# Patient Record
Sex: FEMALE | Race: WHITE | Employment: OTHER | URBAN - METROPOLITAN AREA
[De-identification: names, ages, dates, MRNs, and addresses within clinical notes are randomized per-mention and may not be internally consistent; named-entity substitution may affect disease eponyms.]

---

## 2021-07-06 ENCOUNTER — OFFICE VISIT (OUTPATIENT)
Dept: FAMILY MEDICINE CLINIC | Facility: CLINIC | Age: 61
End: 2021-07-06
Payer: COMMERCIAL

## 2021-07-06 ENCOUNTER — TELEPHONE (OUTPATIENT)
Dept: FAMILY MEDICINE CLINIC | Facility: CLINIC | Age: 61
End: 2021-07-06

## 2021-07-06 VITALS
DIASTOLIC BLOOD PRESSURE: 80 MMHG | RESPIRATION RATE: 16 BRPM | SYSTOLIC BLOOD PRESSURE: 120 MMHG | TEMPERATURE: 99 F | BODY MASS INDEX: 19.6 KG/M2 | WEIGHT: 112 LBS | HEIGHT: 63.5 IN | HEART RATE: 64 BPM

## 2021-07-06 DIAGNOSIS — G12.29 PSEUDOBULBAR PALSY (HCC): ICD-10-CM

## 2021-07-06 DIAGNOSIS — Z12.31 ENCOUNTER FOR SCREENING MAMMOGRAM FOR BREAST CANCER: Primary | ICD-10-CM

## 2021-07-06 DIAGNOSIS — Z00.00 ROUTINE HEALTH MAINTENANCE: ICD-10-CM

## 2021-07-06 DIAGNOSIS — G40.209 PARTIAL SYMPTOMATIC EPILEPSY WITH COMPLEX PARTIAL SEIZURES, NOT INTRACTABLE, WITHOUT STATUS EPILEPTICUS (HCC): ICD-10-CM

## 2021-07-06 PROBLEM — R56.9 SEIZURE (HCC): Status: ACTIVE | Noted: 2019-07-10

## 2021-07-06 PROCEDURE — 3074F SYST BP LT 130 MM HG: CPT | Performed by: FAMILY MEDICINE

## 2021-07-06 PROCEDURE — 3008F BODY MASS INDEX DOCD: CPT | Performed by: FAMILY MEDICINE

## 2021-07-06 PROCEDURE — 99386 PREV VISIT NEW AGE 40-64: CPT | Performed by: FAMILY MEDICINE

## 2021-07-06 PROCEDURE — 3079F DIAST BP 80-89 MM HG: CPT | Performed by: FAMILY MEDICINE

## 2021-07-06 RX ORDER — LEVETIRACETAM 750 MG/1
750 TABLET ORAL 2 TIMES DAILY
COMMUNITY
Start: 2021-06-03 | End: 2021-08-03

## 2021-07-06 RX ORDER — ASPIRIN 81 MG/1
1 TABLET, CHEWABLE ORAL DAILY
COMMUNITY

## 2021-07-06 NOTE — TELEPHONE ENCOUNTER
JERALD PTS SPOUSE CALLED HE SCHEDULED AN APPOINTMENT FOR PT TO HAVE HER LAB WORK DONE AT [address] [place] [address]. HE IS WANTING TO KNOW IF WE COULD PLEASE FAX ORDERS TO THEM. Writer returned patient's call to inquire on symptoms. Patient has had jaw pressure and pain for the past three weeks. For this she saw her dentist who then prescribed an antibiotic (patient believes to to be Amoxicillin). Patient took for duration prescribed. Pain went away and then returned after antibiotic was complete. Patient returned to the dentist this morning to be reevaluated. Patient states that dentist prescribed a different antibiotic along with a steroid. Writer suggested patient start the new antibiotic and steroid prescribed and to call back if her symptoms do not improve with treatment or get worse. Patient agreeable to plan.

## 2021-07-06 NOTE — PROGRESS NOTES
HPI:   Eddie Naqvi is a 59year old female who presents for a complete physical exam. Symptoms: denies discharge, itching, burning or dysuria. Patient complains of seizures , she had a neurologist but they have not been able to contact him for refills? shortness of breath with exertion  CARDIOVASCULAR: denies chest pain on exertion  GI: denies abdominal pain,denies heartburn  : denies dysuria, vaginal discharge or itching,periods regular   MUSCULOSKELETAL: denies back pain  NEURO: HX of seizures, and n breast cancer  (primary encounter diagnosis)    May need a new brace and ?  PT eval, await labs    Imaging & Consults:  STEVEN SCREENING ANGLE (CPT=77067)   INSTRUCTED TO TRY AND GET 5 SERVINGS OF FRUITS AND VEGETABLES DAILY  4 SERVINGS OF WATER  3 SERVINGS OF

## 2021-07-20 ENCOUNTER — TELEPHONE (OUTPATIENT)
Dept: FAMILY MEDICINE CLINIC | Facility: CLINIC | Age: 61
End: 2021-07-20

## 2021-07-20 DIAGNOSIS — G12.29 PSEUDOBULBAR PALSY (HCC): Primary | ICD-10-CM

## 2021-07-20 DIAGNOSIS — M21.371 ACQUIRED RIGHT FOOT DROP: ICD-10-CM

## 2021-07-20 NOTE — TELEPHONE ENCOUNTER
returned call- he was advised of results- verbalized understanding    [facility] was sent [phone]    Hard Copy of results sent to scanning

## 2021-07-20 NOTE — TELEPHONE ENCOUNTER
Dr. Brianna Rivers received outside results    Per his notes:  Notify labs look good- recheck in 1 year    Can we check on getting new leg brace      Pt needs AFO for foot drop on R leg- order pended for provider review.   Needs DX    Please route back to RN

## 2021-08-03 RX ORDER — LEVETIRACETAM 750 MG/1
1500 TABLET ORAL 2 TIMES DAILY
Qty: 180 TABLET | Refills: 2 | Status: SHIPPED | OUTPATIENT
Start: 2021-08-03 | End: 2021-09-07

## 2021-08-03 NOTE — TELEPHONE ENCOUNTER
Routing to provider per protocol. Only listed as Historical in Chart. Last seen on 7/6/21. Future Appointments   Date Time Provider Jessica Joaquin   9/27/2021  3:00 PM MD RALPH Kinsey      Please see message below.   Than

## 2021-08-03 NOTE — TELEPHONE ENCOUNTER
Pt is calling for a refill on Levetiracetam 750mg 2 tabs po 2 times daily to be sent to the [place] [address] in [place]. Pt thought she was going to have enough pills to last her until she got an appointment with Neurology- Dr. Marysol Wells.  Pt has an appt schedule w

## 2021-08-09 ENCOUNTER — TELEPHONE (OUTPATIENT)
Dept: FAMILY MEDICINE CLINIC | Facility: CLINIC | Age: 61
End: 2021-08-09

## 2021-09-07 ENCOUNTER — TELEPHONE (OUTPATIENT)
Dept: NEUROLOGY | Facility: CLINIC | Age: 61
End: 2021-09-07

## 2021-09-07 ENCOUNTER — OFFICE VISIT (OUTPATIENT)
Dept: NEUROLOGY | Facility: CLINIC | Age: 61
End: 2021-09-07
Payer: COMMERCIAL

## 2021-09-07 VITALS
RESPIRATION RATE: 16 BRPM | SYSTOLIC BLOOD PRESSURE: 102 MMHG | HEIGHT: 63.5 IN | WEIGHT: 115 LBS | HEART RATE: 59 BPM | OXYGEN SATURATION: 96 % | DIASTOLIC BLOOD PRESSURE: 60 MMHG | BODY MASS INDEX: 20.12 KG/M2

## 2021-09-07 DIAGNOSIS — R56.9 SEIZURES (HCC): Primary | ICD-10-CM

## 2021-09-07 PROCEDURE — 3074F SYST BP LT 130 MM HG: CPT | Performed by: HOSPITALIST

## 2021-09-07 PROCEDURE — 3078F DIAST BP <80 MM HG: CPT | Performed by: HOSPITALIST

## 2021-09-07 PROCEDURE — 3008F BODY MASS INDEX DOCD: CPT | Performed by: HOSPITALIST

## 2021-09-07 PROCEDURE — 99202 OFFICE O/P NEW SF 15 MIN: CPT | Performed by: HOSPITALIST

## 2021-09-07 RX ORDER — LEVETIRACETAM 750 MG/1
1500 TABLET ORAL 2 TIMES DAILY
Qty: 180 TABLET | Refills: 5 | Status: SHIPPED | OUTPATIENT
Start: 2021-09-07 | End: 2021-12-06

## 2021-09-07 RX ORDER — DIPHENHYDRAMINE HCL 25 MG
25 CAPSULE ORAL
COMMUNITY

## 2021-09-07 NOTE — H&P
Neurology H&P    Estuardo Boyce Patient Status:  No patient class for patient encounter    4/15/1960 MRN [id number]   Location [facility] Attending No att. providers found   [facility] Day # 0 PCP Ajith Garcia DO     Subjective:  Aleks Scott i disorder. ROS:  10 point ROS completed and was negative, except for pertinent positive and negatives stated in subjective. Objective/Physical Exam:    Vital Signs:  Blood pressure 102/60, pulse 59, resp.  rate 16, height 63.5\", weight 115 lb (52 current dose. Placed order for refill  -Routine EEG ordered  -Signed waiver to obtain more outside records  -Follow-up in 6 months. Patient informed me that she does not drive or operate heavy machinery.       Aubree Iniguez MD

## 2021-09-07 NOTE — PROGRESS NOTES
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 Patient states her last seizure was 3 years ago. Patient is taking levetiracetam 750 MG Oral Tab 2 tabs BID.

## 2022-01-17 ENCOUNTER — OFFICE VISIT (OUTPATIENT)
Dept: FAMILY MEDICINE CLINIC | Facility: CLINIC | Age: 62
End: 2022-01-17
Payer: COMMERCIAL

## 2022-01-17 VITALS
OXYGEN SATURATION: 98 % | SYSTOLIC BLOOD PRESSURE: 120 MMHG | HEART RATE: 78 BPM | WEIGHT: 107 LBS | RESPIRATION RATE: 16 BRPM | DIASTOLIC BLOOD PRESSURE: 80 MMHG | BODY MASS INDEX: 18.72 KG/M2 | HEIGHT: 63.5 IN | TEMPERATURE: 98 F

## 2022-01-17 DIAGNOSIS — L91.8 SKIN TAG: Primary | ICD-10-CM

## 2022-01-17 PROCEDURE — 3008F BODY MASS INDEX DOCD: CPT | Performed by: FAMILY MEDICINE

## 2022-01-17 PROCEDURE — 3074F SYST BP LT 130 MM HG: CPT | Performed by: FAMILY MEDICINE

## 2022-01-17 PROCEDURE — 3079F DIAST BP 80-89 MM HG: CPT | Performed by: FAMILY MEDICINE

## 2022-01-17 PROCEDURE — 99213 OFFICE O/P EST LOW 20 MIN: CPT | Performed by: FAMILY MEDICINE

## 2022-01-17 NOTE — PROGRESS NOTES
Rachana Weinberg is a 59year old female. HPI:   Kimo Bennett is here for evaluation of skin lesions on her neck.  Apparently some time ago she had numerous lesions that she saw dermatology for and had them removed, she is not sure what they were, but notes that s Skin tag  (primary encounter diagnosis)    Applied  liquid nitrogen  to the site  in freeze thaw fashion x 3, keep clean and dry RTC in 2 weeks to recheck if still present   Keep covered with a bandaid no ointment at this time    Meds & Refills for [facility]

## 2022-02-19 ENCOUNTER — OFFICE VISIT (OUTPATIENT)
Dept: FAMILY MEDICINE CLINIC | Facility: CLINIC | Age: 62
End: 2022-02-19
Payer: COMMERCIAL

## 2022-02-19 VITALS
HEART RATE: 62 BPM | WEIGHT: 107 LBS | BODY MASS INDEX: 18.96 KG/M2 | HEIGHT: 63 IN | OXYGEN SATURATION: 98 % | TEMPERATURE: 98 F | SYSTOLIC BLOOD PRESSURE: 142 MMHG | DIASTOLIC BLOOD PRESSURE: 80 MMHG

## 2022-02-19 DIAGNOSIS — M76.31 IT BAND SYNDROME, RIGHT: Primary | ICD-10-CM

## 2022-02-19 DIAGNOSIS — R26.81 GAIT INSTABILITY: ICD-10-CM

## 2022-02-19 DIAGNOSIS — R29.6 FREQUENT FALLS: ICD-10-CM

## 2022-02-19 DIAGNOSIS — R29.898 WEAKNESS OF RIGHT LOWER EXTREMITY: ICD-10-CM

## 2022-02-19 PROCEDURE — 3077F SYST BP >= 140 MM HG: CPT | Performed by: FAMILY MEDICINE

## 2022-02-19 PROCEDURE — 99213 OFFICE O/P EST LOW 20 MIN: CPT | Performed by: FAMILY MEDICINE

## 2022-02-19 PROCEDURE — 3008F BODY MASS INDEX DOCD: CPT | Performed by: FAMILY MEDICINE

## 2022-02-19 PROCEDURE — 3079F DIAST BP 80-89 MM HG: CPT | Performed by: FAMILY MEDICINE

## 2022-02-21 ENCOUNTER — TELEPHONE (OUTPATIENT)
Dept: FAMILY MEDICINE CLINIC | Facility: CLINIC | Age: 62
End: 2022-02-21

## 2022-02-21 NOTE — TELEPHONE ENCOUNTER
KERI FROM HOME [facility]ALTH CALLED AND ADV THAT THEY REACHED OUT TO PT AND PT ADV FEELING MUCH BETTER AND NOT NEEDING ANY SERVICES AT THIS TIME.     JUST WANTED TO UPDATE  Via Michael Poole

## 2022-03-04 ENCOUNTER — TELEPHONE (OUTPATIENT)
Dept: FAMILY MEDICINE CLINIC | Facility: CLINIC | Age: 62
End: 2022-03-04

## 2022-04-27 ENCOUNTER — OFFICE VISIT (OUTPATIENT)
Dept: NEUROLOGY | Facility: CLINIC | Age: 62
End: 2022-04-27
Payer: COMMERCIAL

## 2022-04-27 VITALS
BODY MASS INDEX: 19.14 KG/M2 | RESPIRATION RATE: 16 BRPM | WEIGHT: 108 LBS | DIASTOLIC BLOOD PRESSURE: 68 MMHG | SYSTOLIC BLOOD PRESSURE: 110 MMHG | OXYGEN SATURATION: 99 % | HEIGHT: 63 IN | HEART RATE: 68 BPM

## 2022-04-27 DIAGNOSIS — R56.9 SEIZURES (HCC): Primary | ICD-10-CM

## 2022-04-27 PROCEDURE — 3074F SYST BP LT 130 MM HG: CPT | Performed by: HOSPITALIST

## 2022-04-27 PROCEDURE — 99213 OFFICE O/P EST LOW 20 MIN: CPT | Performed by: HOSPITALIST

## 2022-04-27 PROCEDURE — 3008F BODY MASS INDEX DOCD: CPT | Performed by: HOSPITALIST

## 2022-04-27 PROCEDURE — 3078F DIAST BP <80 MM HG: CPT | Performed by: HOSPITALIST

## 2022-04-27 RX ORDER — LEVETIRACETAM 750 MG/1
1500 TABLET ORAL 2 TIMES DAILY
Qty: 180 TABLET | Refills: 11 | Status: SHIPPED | OUTPATIENT
Start: 2022-04-27 | End: 2022-07-26

## 2022-04-27 NOTE — PROGRESS NOTES
LOV 9/7/21 Seizures f/u- Patient reports no seizures since LOV. Patient is taking keppra 1,500 mg BID.

## 2022-05-27 ENCOUNTER — TELEPHONE (OUTPATIENT)
Dept: NEUROLOGY | Facility: CLINIC | Age: 62
End: 2022-05-27

## 2022-10-05 ENCOUNTER — TELEPHONE (OUTPATIENT)
Dept: FAMILY MEDICINE CLINIC | Facility: CLINIC | Age: 62
End: 2022-10-05

## 2022-10-05 NOTE — TELEPHONE ENCOUNTER
Routing to provider- can you please review immunizations?   Pt is coming in for flu shot but is wondering if she needs anything else

## 2022-10-17 ENCOUNTER — IMMUNIZATION (OUTPATIENT)
Dept: FAMILY MEDICINE CLINIC | Facility: CLINIC | Age: 62
End: 2022-10-17
Payer: COMMERCIAL

## 2022-10-17 DIAGNOSIS — Z23 NEED FOR VACCINATION: Primary | ICD-10-CM

## 2022-11-22 ENCOUNTER — TELEPHONE (OUTPATIENT)
Dept: FAMILY MEDICINE CLINIC | Facility: CLINIC | Age: 62
End: 2022-11-22

## 2022-11-22 DIAGNOSIS — G40.209 PARTIAL SYMPTOMATIC EPILEPSY WITH COMPLEX PARTIAL SEIZURES, NOT INTRACTABLE, WITHOUT STATUS EPILEPTICUS (HCC): Primary | ICD-10-CM

## 2022-11-22 NOTE — TELEPHONE ENCOUNTER
SPOUSE CALLED AND ADV THAT REFERRAL THAT WAS PLACED 3/22 FOR   NEUROLOGY - DR Yessenia Castillo HAS LEFT THE PRACTICE. ADV PT NEEDS NEW REFERRAL PLACED FOR DR Bartlett Kanner.     PLEASE AND THANK YOU    BINA

## 2022-12-12 ENCOUNTER — TELEPHONE (OUTPATIENT)
Dept: FAMILY MEDICINE CLINIC | Facility: CLINIC | Age: 62
End: 2022-12-12

## 2022-12-12 NOTE — TELEPHONE ENCOUNTER
Pt and  were advised of recommendations for medication and verbalized understanding    Will call back if sxs do not resolve

## 2022-12-12 NOTE — TELEPHONE ENCOUNTER
[facility] [address] Jack Solorzano [address] [place] [address] ([address]), [phone], [phone] [address]   Phone: [phone] Fax: [phone]   Hours: Not open 24 hours     PATIENT ASKING IF DR HAS ANY AVAILABILITIES TODAY OR RECOMMENDATIONS. PATIENT SAYS SHE IS NEGATIVE FOR COVID. SHE SAYS SHE STARTED OUT WITH A HARD COUGH AND NOW GOING TO HER EARS. SHE SAYS IT IS ONE THING AFTER ANOTHER. SHE SAYS HER COLD IS NOT GOING AWAY.

## 2022-12-12 NOTE — TELEPHONE ENCOUNTER
Pt denies fever at this time time    Pt denies coughing anything up. Pt states coughing is worse at night. Pt is not blowing anything out of her nose. Pt feels her ears are plugged up- they pop, it is painful but it goes away. Pt denies temp. Pt has been managing SXS with Zinc, motrin and nothing seems to work    Pt is looking to be seen or have something called in.

## 2022-12-12 NOTE — TELEPHONE ENCOUNTER
Well she can use some Afrin nasal spray 2 sp in each side bid for 3 days, and then 1 sp in each side for 3 days, and then can use Use some OTC flonase 2 puffs per nostril at hs daily and call regarding efficacy  And maybe some Mucinex plain bid

## 2023-02-06 ENCOUNTER — OFFICE VISIT (OUTPATIENT)
Dept: FAMILY MEDICINE CLINIC | Facility: CLINIC | Age: 63
End: 2023-02-06
Payer: COMMERCIAL

## 2023-02-06 VITALS
WEIGHT: 114.38 LBS | DIASTOLIC BLOOD PRESSURE: 76 MMHG | RESPIRATION RATE: 18 BRPM | OXYGEN SATURATION: 98 % | BODY MASS INDEX: 20 KG/M2 | TEMPERATURE: 98 F | SYSTOLIC BLOOD PRESSURE: 126 MMHG | HEART RATE: 74 BPM

## 2023-02-06 DIAGNOSIS — J30.1 SEASONAL ALLERGIC RHINITIS DUE TO POLLEN: Primary | ICD-10-CM

## 2023-02-06 DIAGNOSIS — H90.11 CONDUCTIVE HEARING LOSS OF RIGHT EAR, UNSPECIFIED HEARING STATUS ON CONTRALATERAL SIDE: ICD-10-CM

## 2023-02-06 DIAGNOSIS — H69.81 ETD (EUSTACHIAN TUBE DYSFUNCTION), RIGHT: ICD-10-CM

## 2023-02-06 PROCEDURE — 82785 ASSAY OF IGE: CPT | Performed by: FAMILY MEDICINE

## 2023-02-06 PROCEDURE — 86003 ALLG SPEC IGE CRUDE XTRC EA: CPT | Performed by: FAMILY MEDICINE

## 2023-02-09 LAB
A ALTERNATA IGE QN: <0.1 KUA/L (ref ?–0.1)
A FUMIGATUS IGE QN: <0.1 KUA/L (ref ?–0.1)
AMER SYCAMORE IGE QN: <0.1 KUA/L (ref ?–0.1)
BERMUDA GRASS IGE QN: <0.1 KUA/L (ref ?–0.1)
BOXELDER IGE QN: <0.1 KUA/L (ref ?–0.1)
C HERBARUM IGE QN: <0.1 KUA/L (ref ?–0.1)
CALIF WALNUT IGE QN: <0.1 KUA/L (ref ?–0.1)
CAT DANDER IGE QN: <0.1 KUA/L (ref ?–0.1)
CLAM IGE QN: <0.1 KUA/L (ref ?–0.1)
CMN PIGWEED IGE QN: <0.1 KUA/L (ref ?–0.1)
CODFISH IGE QN: <0.1 KUA/L (ref ?–0.1)
COMMON RAGWEED IGE QN: <0.1 KUA/L (ref ?–0.1)
CORN IGE QN: <0.1 KUA/L (ref ?–0.1)
COTTONWOOD IGE QN: <0.1 KUA/L (ref ?–0.1)
COW MILK IGE QN: <0.1 KUA/L (ref ?–0.1)
D FARINAE IGE QN: <0.1 KUA/L (ref ?–0.1)
D PTERONYSS IGE QN: <0.1 KUA/L (ref ?–0.1)
DOG DANDER IGE QN: <0.1 KUA/L (ref ?–0.1)
EGG WHITE IGE QN: <0.1 KUA/L (ref ?–0.1)
IGE SERPL-ACNC: 24.6 KU/L (ref 2–214)
IGE SERPL-ACNC: 24.8 KU/L (ref 2–214)
M RACEMOSUS IGE QN: <0.1 KUA/L (ref ?–0.1)
MARSH ELDER IGE QN: <0.1 KUA/L (ref ?–0.1)
MOUSE EPITH IGE QN: <0.1 KUA/L (ref ?–0.1)
MT JUNIPER IGE QN: <0.1 KUA/L (ref ?–0.1)
P NOTATUM IGE QN: <0.1 KUA/L (ref ?–0.1)
PEANUT IGE QN: <0.1 KUA/L (ref ?–0.1)
PECAN/HICK TREE IGE QN: <0.1 KUA/L (ref ?–0.1)
ROACH IGE QN: <0.1 KUA/L (ref ?–0.1)
SALTWORT IGE QN: <0.1 KUA/L (ref ?–0.1)
SCALLOP IGE QN: <0.1 KUA/L (ref ?–0.1)
SESAME SEED IGE QN: <0.1 KUA/L (ref ?–0.1)
SHRIMP IGE QN: <0.1 KUA/L (ref ?–0.1)
SOYBEAN IGE QN: <0.1 KUA/L (ref ?–0.1)
TIMOTHY IGE QN: <0.1 KUA/L (ref ?–0.1)
WALNUT IGE QN: <0.1 KUA/L (ref ?–0.1)
WHEAT IGE QN: <0.1 KUA/L (ref ?–0.1)
WHITE ASH IGE QN: <0.1 KUA/L (ref ?–0.1)
WHITE ELM IGE QN: <0.1 KUA/L (ref ?–0.1)
WHITE MULBERRY IGE QN: <0.1 KUA/L (ref ?–0.1)
WHITE OAK IGE QN: <0.1 KUA/L (ref ?–0.1)

## 2023-02-10 ENCOUNTER — TELEPHONE (OUTPATIENT)
Dept: FAMILY MEDICINE CLINIC | Facility: CLINIC | Age: 63
End: 2023-02-10

## 2023-02-10 NOTE — TELEPHONE ENCOUNTER
----- Message from Yosef Chadwick DO sent at 2/10/2023  8:42 AM CST -----  Can notify Jessica Silva that her allergy profile came back negative for the most common food ingested allergies including dairy, soy and wheat , and her environmental allergy profile came back negative as well. This doesn;t mean she doesn't have allergies, it just means that she's not allergic to the things we tested for. So I think we are going to have to find something that works for her Sx. If she hasn;t tried flonase, maybe 2 puffs in each nostril at bedtime? And or she could try some Allegra.  If she has used Flonase, then maybe something like Astelin? instead

## 2023-02-20 ENCOUNTER — TELEPHONE (OUTPATIENT)
Dept: FAMILY MEDICINE CLINIC | Facility: CLINIC | Age: 63
End: 2023-02-20

## 2023-02-20 DIAGNOSIS — N95.0 POSTMENOPAUSAL BLEEDING: Primary | ICD-10-CM

## 2023-02-20 NOTE — TELEPHONE ENCOUNTER
Pt states about last week it started as spotting- a couple brown spots located in her underwear. She states the last couple days there has been blood- she needs to wear a light pad and that lasts all day. Pt describes at as watery/redish discharge. Pt states she is having light period cramps. Pt states  No new medications. Pt states it is not hemorrhoids.      Pt states she has no female family/friends to speak with so she is not sure if this is part of her normal path through menopause or if she should be concerned    Routing to provider

## 2023-02-20 NOTE — TELEPHONE ENCOUNTER
PT CALLED AND ADV HAS SOME MENOPAUSE QUESTIONS. PT HAS AN APPT W/OBGYN BUT WILL NOT GIVE OUT ANY INFO BEFORE BEING SEEN. PT ADV THAT SHE HAS NOTICED SOME BROWN SPOTS IN UNDERWEAR - AND TODAY WOKE UP WITH SOME BLOOD. PT ADV FEELS LIKE HER PERIOD. SHE HASN'T HAD THIS SINCE (SHE THINKS) 1991. LOOKING FOR SOME SORT OF ANSWERS?     PLEASE ADV    THANK YOU

## 2023-02-20 NOTE — TELEPHONE ENCOUNTER
Pt was advised- verbalized understanding    Pt was provided with number to Central Scheduling to schedule ultra sound

## 2023-02-20 NOTE — TELEPHONE ENCOUNTER
Well anytime there is bleeding post menopause, we have to take a look at the uterus and make sure the lining of the uterus is OK, I will place the order, she may be able to get it done here.  If not there then  where ever

## 2023-03-09 ENCOUNTER — HOSPITAL ENCOUNTER (OUTPATIENT)
Dept: ULTRASOUND IMAGING | Age: 63
Discharge: HOME | End: 2023-03-09
Attending: FAMILY MEDICINE
Payer: COMMERCIAL

## 2023-03-09 DIAGNOSIS — N95.0 POSTMENOPAUSAL BLEEDING: ICD-10-CM

## 2023-03-09 PROCEDURE — 76856 US EXAM PELVIC COMPLETE: CPT | Performed by: FAMILY MEDICINE

## 2023-03-09 PROCEDURE — 76830 TRANSVAGINAL US NON-OB: CPT | Performed by: FAMILY MEDICINE

## 2023-03-10 ENCOUNTER — TELEPHONE (OUTPATIENT)
Dept: FAMILY MEDICINE CLINIC | Facility: CLINIC | Age: 63
End: 2023-03-10

## 2023-03-10 DIAGNOSIS — N95.0 POSTMENOPAUSAL BLEEDING: Primary | ICD-10-CM

## 2023-03-10 NOTE — TELEPHONE ENCOUNTER
----- Message from Julisa Shelby, DO sent at 3/10/2023  7:55 AM CST -----  Please notify Lyndsay Beck , that her US showed she has a small polyp in the uterus and a fibroid. I'm not sure that either of these is to blame, but at this point I need her to see a Gyne. I will place an order for [facility], she can set up an appt with any of them.  Not sure who goes to [facility] still , but when she calls make sure she tells the she's had vaginal bleeding so they don;t put her off for 2 months

## 2023-03-10 NOTE — TELEPHONE ENCOUNTER
Advised patient of Doctor's note below. Patient verbalized understanding. Pt stated she has future appt with EVER BEJARANO 04/06/23    Pt asking if that is ok? Does she need to be seen sooner? Please advise, thank you    Advised pt that if Dr. Lupis Ceballos recommends being seen sooner - will call pt back - otherwise, keep future appt - she v/u. No further questions at this time.     Future Appointments   Date Time Provider Jessica Joaquin   3/20/2023  2:40 PM Janki Torres MD [facility] [address]   4/6/2023  4:30 PM DARCI Ramires [facility] [address]

## 2023-03-20 ENCOUNTER — OFFICE VISIT (OUTPATIENT)
Dept: NEUROLOGY | Facility: CLINIC | Age: 63
End: 2023-03-20
Payer: COMMERCIAL

## 2023-03-20 VITALS — HEART RATE: 68 BPM | RESPIRATION RATE: 16 BRPM | DIASTOLIC BLOOD PRESSURE: 70 MMHG | SYSTOLIC BLOOD PRESSURE: 114 MMHG

## 2023-03-20 DIAGNOSIS — R56.9 SEIZURES (HCC): ICD-10-CM

## 2023-03-20 DIAGNOSIS — M21.371 RIGHT FOOT DROP: Primary | ICD-10-CM

## 2023-03-20 RX ORDER — LEVETIRACETAM 750 MG/1
750 TABLET ORAL 2 TIMES DAILY
Qty: 180 TABLET | Refills: 3 | Status: SHIPPED | OUTPATIENT
Start: 2023-03-20

## 2023-03-29 ENCOUNTER — TELEPHONE (OUTPATIENT)
Dept: FAMILY MEDICINE CLINIC | Facility: CLINIC | Age: 63
End: 2023-03-29

## 2023-03-29 DIAGNOSIS — M21.371 RIGHT FOOT DROP: Primary | ICD-10-CM

## 2023-03-29 NOTE — TELEPHONE ENCOUNTER
PATIENT SAYS HER NEURO DR Jean Osborne WROTE A SCRIPT FOR PATIENT TO HAVE [facility]. PATIENT NEEDS A REFERRAL. INFORMATION IN LAST NOTE BY DR Jean Osborne.

## 2023-03-29 NOTE — TELEPHONE ENCOUNTER
[facility] in [place] should be able to do AFO for foot drop- need to see the order per Neuro

## 2023-03-29 NOTE — TELEPHONE ENCOUNTER
RN Spoke with pt - wanted to see if they were provided with paper order for the foot AFO     states that the script states AFO- he states they talked to somewhere in [address] [place] [address]?        states he will bring by a copy of the script so RN can review and we can find an in-network provider

## 2023-04-06 ENCOUNTER — OFFICE VISIT (OUTPATIENT)
Dept: OBGYN CLINIC | Facility: CLINIC | Age: 63
End: 2023-04-06
Payer: COMMERCIAL

## 2023-04-06 VITALS
BODY MASS INDEX: 18.52 KG/M2 | DIASTOLIC BLOOD PRESSURE: 72 MMHG | WEIGHT: 109.81 LBS | HEIGHT: 64.5 IN | SYSTOLIC BLOOD PRESSURE: 110 MMHG

## 2023-04-06 DIAGNOSIS — N95.0 POSTMENOPAUSAL BLEEDING: ICD-10-CM

## 2023-04-06 DIAGNOSIS — Z12.4 SCREENING FOR CERVICAL CANCER: Primary | ICD-10-CM

## 2023-04-06 DIAGNOSIS — Z11.51 SCREENING FOR HUMAN PAPILLOMAVIRUS (HPV): ICD-10-CM

## 2023-04-06 PROCEDURE — 3078F DIAST BP <80 MM HG: CPT | Performed by: NURSE PRACTITIONER

## 2023-04-06 PROCEDURE — 3074F SYST BP LT 130 MM HG: CPT | Performed by: NURSE PRACTITIONER

## 2023-04-06 PROCEDURE — 3008F BODY MASS INDEX DOCD: CPT | Performed by: NURSE PRACTITIONER

## 2023-04-06 PROCEDURE — 87624 HPV HI-RISK TYP POOLED RSLT: CPT | Performed by: NURSE PRACTITIONER

## 2023-04-06 PROCEDURE — 99202 OFFICE O/P NEW SF 15 MIN: CPT | Performed by: NURSE PRACTITIONER

## 2023-04-07 ENCOUNTER — TELEPHONE (OUTPATIENT)
Dept: FAMILY MEDICINE CLINIC | Facility: CLINIC | Age: 63
End: 2023-04-07

## 2023-04-07 ENCOUNTER — TELEPHONE (OUTPATIENT)
Dept: OBGYN CLINIC | Facility: CLINIC | Age: 63
End: 2023-04-07

## 2023-04-07 DIAGNOSIS — Z12.31 SCREENING MAMMOGRAM FOR BREAST CANCER: Primary | ICD-10-CM

## 2023-04-07 LAB — HPV I/H RISK 1 DNA SPEC QL NAA+PROBE: NEGATIVE

## 2023-04-07 NOTE — TELEPHONE ENCOUNTER
Patient called to check on her DME referral for her foot    She is wanting to know if it's approved and location    She is wanting to know if it is for somewhere local    Please adv  Thank you

## 2023-04-07 NOTE — TELEPHONE ENCOUNTER
Patient seen Samira yesterday and was not given order for Mammogram please enter and call when done, she does not use [employer]    Thank you

## 2023-04-07 NOTE — TELEPHONE ENCOUNTER
Spoke to patient who states she used to get her mammogram done at [place] [address], but wants to go to [facility] now. I advised they will have her sign a release when she goes to get old mammogram films for comparison. Understanding expressed. Number provided to schedule with [facility].

## 2023-04-13 ENCOUNTER — HOSPITAL ENCOUNTER (OUTPATIENT)
Dept: MAMMOGRAPHY | Age: 63
Discharge: HOME | End: 2023-04-13
Attending: NURSE PRACTITIONER
Payer: COMMERCIAL

## 2023-04-13 DIAGNOSIS — Z12.31 SCREENING MAMMOGRAM FOR BREAST CANCER: ICD-10-CM

## 2023-04-13 PROCEDURE — 77063 BREAST TOMOSYNTHESIS BI: CPT | Performed by: NURSE PRACTITIONER

## 2023-04-13 PROCEDURE — 77067 SCR MAMMO BI INCL CAD: CPT | Performed by: NURSE PRACTITIONER

## 2023-06-09 ENCOUNTER — HOSPITAL ENCOUNTER (OUTPATIENT)
Dept: MAMMOGRAPHY | Facility: HOSPITAL | Age: 63
Discharge: HOME | End: 2023-06-09
Attending: NURSE PRACTITIONER
Payer: COMMERCIAL

## 2023-06-09 ENCOUNTER — OFFICE VISIT (OUTPATIENT)
Dept: OBGYN CLINIC | Facility: CLINIC | Age: 63
End: 2023-06-09
Payer: COMMERCIAL

## 2023-06-09 VITALS
SYSTOLIC BLOOD PRESSURE: 120 MMHG | HEIGHT: 64.5 IN | DIASTOLIC BLOOD PRESSURE: 78 MMHG | BODY MASS INDEX: 18.15 KG/M2 | HEART RATE: 68 BPM | WEIGHT: 107.63 LBS

## 2023-06-09 DIAGNOSIS — R92.2 INCONCLUSIVE MAMMOGRAM: ICD-10-CM

## 2023-06-09 DIAGNOSIS — N95.0 POSTMENOPAUSAL BLEEDING: Primary | ICD-10-CM

## 2023-06-09 PROCEDURE — 88305 TISSUE EXAM BY PATHOLOGIST: CPT | Performed by: OBSTETRICS & GYNECOLOGY

## 2023-06-09 PROCEDURE — 3074F SYST BP LT 130 MM HG: CPT | Performed by: OBSTETRICS & GYNECOLOGY

## 2023-06-09 PROCEDURE — 77062 BREAST TOMOSYNTHESIS BI: CPT | Performed by: NURSE PRACTITIONER

## 2023-06-09 PROCEDURE — 3008F BODY MASS INDEX DOCD: CPT | Performed by: OBSTETRICS & GYNECOLOGY

## 2023-06-09 PROCEDURE — 3078F DIAST BP <80 MM HG: CPT | Performed by: OBSTETRICS & GYNECOLOGY

## 2023-06-09 PROCEDURE — 58100 BIOPSY OF UTERUS LINING: CPT | Performed by: OBSTETRICS & GYNECOLOGY

## 2023-06-09 PROCEDURE — 77066 DX MAMMO INCL CAD BI: CPT | Performed by: NURSE PRACTITIONER

## 2023-06-09 PROCEDURE — 76642 ULTRASOUND BREAST LIMITED: CPT | Performed by: NURSE PRACTITIONER

## 2023-06-09 NOTE — PROGRESS NOTES
[facility]  Obstetrics and Gynecology  Procedure Note      Rell Soliman is a 70year old  postmenopausal female who presents today for endometrial biopsy due to postmenopausal bleeding. Reviewed US findings. Discussed option of hysteroscopy with polypectomy instead, she would prefer to proceed with EMB today. Consent form was signed, and the procedure was reviewed in detail. 23  0810   BP: 120/78   Pulse: 68        Procedure details: The procedure, risks, benefits and alternatives were discussed with the patient. The patient was informed of risks including but not limited to the risk of bleeding, infection, injury. All questions and concerns were addressed. The patient provided verbal and written consent. The patient was placed in a supine position with feet positioned into stirrups. An Allis clamp was placed on the anterior lip of the cervix. The endometrial biopsy Pipelle was then advanced through the cervical canal. The uterus sounded to 7 cm. The Pipelle was then engaged with suction force noted and advance in various angles along the uterine cavity. A small amount of endometrial tissue was noted and collected to be sent to pathology. The clamp was removed. The patient tolerated the procedure well. The patient was advised she will be notified with the pathology results for further plan. Advised pelvic rest for a week.     Ayden Heck MD  [facility]  2023 8:15 AM

## 2023-06-09 NOTE — PATIENT INSTRUCTIONS
[facility] Department of OB/GYN  After Care Instructions for Endometrial Biopsy      Biopsy Results   You will receive your biopsy results in 7 business days. If you have not received your results in 7 days, please contact our office. The results of your biopsy will determine if further treatment will be necessary. Bleeding   You may have some light bleeding or blackish clumpy discharge for several days after your biopsy. Restrictions    You should avoid intercourse or tampon use for 1 day after your biopsy. Pain    You may experience mild menstrual cramping after your biopsy. You may use Ibuprofen, Aleve or Tylenol to relieve your discomfort. If you experience severe or persistent pain contact our office. If you have any additional questions, please call us at [phone].

## 2023-06-13 ENCOUNTER — TELEPHONE (OUTPATIENT)
Dept: OBGYN CLINIC | Facility: CLINIC | Age: 63
End: 2023-06-13

## 2023-06-13 DIAGNOSIS — N95.0 POSTMENOPAUSAL BLEEDING: Primary | ICD-10-CM

## 2023-06-13 DIAGNOSIS — N84.0 ENDOMETRIAL POLYP: ICD-10-CM

## 2023-06-15 NOTE — TELEPHONE ENCOUNTER
Surgeon: Dr. Jasiel Lo MD  Assistant: No     Type of Admit: Outpatient Surgery  Procedure Location: Main OR    PreOp Dx: postmenopausal bleeding, suspected endometrial polyp    Procedure: hysteroscopy, dilatation and curettage, possible polypectomy    Anesthesia: General with LMA or MAC per anesthesia    Special Equipment/Comments: Rudolph-clear scope and device. Antibiotics: No antibiotics indicated. No penicillin or cephalosporin allergy.      Pre Op Orders: initiate my Pre-op standing orders, if none exist please use [employer] Order     Labs: Type and screen    Medical clearance: No    Post-op follow up appointment: 4 weeks

## 2023-06-19 NOTE — TELEPHONE ENCOUNTER
Surgery scheduled for 8/4/23 at 26  Post op   Future Appointments   Date Time Provider Jessica Joaquin   9/1/2023  9:45 AM Celia Herman MD [facility] N [facility] [place]     Orders entered  Added to calendar  PA - will work on

## 2023-06-23 ENCOUNTER — TELEPHONE (OUTPATIENT)
Dept: OBGYN CLINIC | Facility: CLINIC | Age: 63
End: 2023-06-23

## 2023-06-23 NOTE — TELEPHONE ENCOUNTER
Pts insurance will be changing at the end of July/early August. Has surgery scheduled with Dr Rachel Whitman 8/4/23, pt would like to know if surgery can possibly be moved up sooner.    Please advise, thank you

## 2023-06-27 NOTE — TELEPHONE ENCOUNTER
Elizabeth Mendez called and is very concerned about the ins might not cover his wife's ins after July 31st and He has been waiting for a few weeks for a response. Would like to reschedule if they could for July. He said his wife gets confused so he would rather discuss this with you. He has a Dr appt at 4:30 today and would appreciate if you could call before then. His Number is [phone].  Thank you

## 2023-07-24 ENCOUNTER — LABORATORY ENCOUNTER (OUTPATIENT)
Dept: LAB | Age: 63
End: 2023-07-24
Attending: OBSTETRICS & GYNECOLOGY
Payer: COMMERCIAL

## 2023-07-24 DIAGNOSIS — Z01.818 PRE-OP TESTING: ICD-10-CM

## 2023-07-24 LAB
ANTIBODY SCREEN: NEGATIVE
BASOPHILS # BLD AUTO: 0.06 X10(3) UL (ref 0–0.2)
BASOPHILS NFR BLD AUTO: 0.8 %
EOSINOPHIL # BLD AUTO: 0.16 X10(3) UL (ref 0–0.7)
EOSINOPHIL NFR BLD AUTO: 2.1 %
ERYTHROCYTE [DISTWIDTH] IN BLOOD BY AUTOMATED COUNT: 12.5 %
HCT VFR BLD AUTO: 46.1 %
HGB BLD-MCNC: 15 G/DL
IMM GRANULOCYTES # BLD AUTO: 0.02 X10(3) UL (ref 0–1)
IMM GRANULOCYTES NFR BLD: 0.3 %
LYMPHOCYTES # BLD AUTO: 2.88 X10(3) UL (ref 1–4)
LYMPHOCYTES NFR BLD AUTO: 38.5 %
MCH RBC QN AUTO: 29.5 PG (ref 26–34)
MCHC RBC AUTO-ENTMCNC: 32.5 G/DL (ref 31–37)
MCV RBC AUTO: 90.7 FL
MONOCYTES # BLD AUTO: 0.56 X10(3) UL (ref 0.1–1)
MONOCYTES NFR BLD AUTO: 7.5 %
NEUTROPHILS # BLD AUTO: 3.8 X10 (3) UL (ref 1.5–7.7)
NEUTROPHILS # BLD AUTO: 3.8 X10(3) UL (ref 1.5–7.7)
NEUTROPHILS NFR BLD AUTO: 50.8 %
PLATELET # BLD AUTO: 268 10(3)UL (ref 150–450)
RBC # BLD AUTO: 5.08 X10(6)UL
RH BLOOD TYPE: POSITIVE
WBC # BLD AUTO: 7.5 X10(3) UL (ref 4–11)

## 2023-07-24 PROCEDURE — 85025 COMPLETE CBC W/AUTO DIFF WBC: CPT

## 2023-07-24 PROCEDURE — 86900 BLOOD TYPING SEROLOGIC ABO: CPT

## 2023-07-24 PROCEDURE — 86850 RBC ANTIBODY SCREEN: CPT

## 2023-07-24 PROCEDURE — 36415 COLL VENOUS BLD VENIPUNCTURE: CPT

## 2023-07-24 PROCEDURE — 86901 BLOOD TYPING SEROLOGIC RH(D): CPT

## 2023-07-31 ENCOUNTER — TELEPHONE (OUTPATIENT)
Dept: FAMILY MEDICINE CLINIC | Facility: CLINIC | Age: 63
End: 2023-07-31

## 2023-07-31 ENCOUNTER — TELEPHONE (OUTPATIENT)
Dept: NEUROLOGY | Facility: CLINIC | Age: 63
End: 2023-07-31

## 2023-07-31 NOTE — TELEPHONE ENCOUNTER
Patient advised that she will need to keep her medication and not skip any doses. Patient verbalized understanding.

## 2023-07-31 NOTE — TELEPHONE ENCOUNTER
Pt states that she is having a procedure this Friday 8/4/23, she is questioning whether or not she should take her medication that morning prior to the procedure or after; levetiracetam 750 MG Oral Tab;  Pt would like a call back; Please advise

## 2023-07-31 NOTE — TELEPHONE ENCOUNTER
Pt is having surgery on Friday and was told to find out about seizure medication. Surgeon wants her to wait until after surgery to take but she usually takes it at 10:00. Is this OK? Also, pt was told to go  5 days without taking her 81 mg aspirin. - concerned regarding bleeding. Is is OK to go without for that long? Please advise. Thank you!

## 2023-08-03 ENCOUNTER — ANESTHESIA EVENT (OUTPATIENT)
Dept: SURGERY | Facility: HOSPITAL | Age: 63
End: 2023-08-03
Payer: COMMERCIAL

## 2023-08-04 ENCOUNTER — ANESTHESIA (OUTPATIENT)
Dept: SURGERY | Facility: HOSPITAL | Age: 63
End: 2023-08-04
Payer: COMMERCIAL

## 2023-08-04 ENCOUNTER — HOSPITAL ENCOUNTER (OUTPATIENT)
Facility: HOSPITAL | Age: 63
Setting detail: HOSPITAL OUTPATIENT SURGERY
Discharge: HOME | End: 2023-08-04
Attending: OBSTETRICS & GYNECOLOGY | Admitting: OBSTETRICS & GYNECOLOGY
Payer: COMMERCIAL

## 2023-08-04 VITALS
SYSTOLIC BLOOD PRESSURE: 112 MMHG | RESPIRATION RATE: 15 BRPM | WEIGHT: 111 LBS | HEIGHT: 64.5 IN | HEART RATE: 51 BPM | BODY MASS INDEX: 18.72 KG/M2 | DIASTOLIC BLOOD PRESSURE: 69 MMHG | OXYGEN SATURATION: 97 % | TEMPERATURE: 98 F

## 2023-08-04 DIAGNOSIS — N95.0 POSTMENOPAUSAL BLEEDING: ICD-10-CM

## 2023-08-04 DIAGNOSIS — Z01.818 PRE-OP TESTING: Primary | ICD-10-CM

## 2023-08-04 DIAGNOSIS — N84.0 ENDOMETRIAL POLYP: ICD-10-CM

## 2023-08-04 PROBLEM — Z98.890 STATUS POST HYSTEROSCOPIC POLYPECTOMY: Status: ACTIVE | Noted: 2023-08-04

## 2023-08-04 PROCEDURE — 58558 HYSTEROSCOPY BIOPSY: CPT | Performed by: OBSTETRICS & GYNECOLOGY

## 2023-08-04 PROCEDURE — 0UB98ZX EXCISION OF UTERUS, VIA NATURAL OR ARTIFICIAL OPENING ENDOSCOPIC, DIAGNOSTIC: ICD-10-PCS | Performed by: OBSTETRICS & GYNECOLOGY

## 2023-08-04 RX ORDER — NALOXONE HYDROCHLORIDE 0.4 MG/ML
80 INJECTION, SOLUTION INTRAMUSCULAR; INTRAVENOUS; SUBCUTANEOUS AS NEEDED
Status: DISCONTINUED | OUTPATIENT
Start: 2023-08-04 | End: 2023-08-04

## 2023-08-04 RX ORDER — DIPHENHYDRAMINE HYDROCHLORIDE 50 MG/ML
12.5 INJECTION INTRAMUSCULAR; INTRAVENOUS AS NEEDED
Status: DISCONTINUED | OUTPATIENT
Start: 2023-08-04 | End: 2023-08-04

## 2023-08-04 RX ORDER — MIDAZOLAM HYDROCHLORIDE 1 MG/ML
INJECTION INTRAMUSCULAR; INTRAVENOUS AS NEEDED
Status: DISCONTINUED | OUTPATIENT
Start: 2023-08-04 | End: 2023-08-04 | Stop reason: SURG

## 2023-08-04 RX ORDER — ONDANSETRON 2 MG/ML
4 INJECTION INTRAMUSCULAR; INTRAVENOUS EVERY 6 HOURS PRN
Status: DISCONTINUED | OUTPATIENT
Start: 2023-08-04 | End: 2023-08-04

## 2023-08-04 RX ORDER — SODIUM CHLORIDE, SODIUM LACTATE, POTASSIUM CHLORIDE, CALCIUM CHLORIDE 600; 310; 30; 20 MG/100ML; MG/100ML; MG/100ML; MG/100ML
INJECTION, SOLUTION INTRAVENOUS CONTINUOUS
Status: DISCONTINUED | OUTPATIENT
Start: 2023-08-04 | End: 2023-08-04

## 2023-08-04 RX ORDER — ACETAMINOPHEN 500 MG
1000 TABLET ORAL ONCE
Status: DISCONTINUED | OUTPATIENT
Start: 2023-08-04 | End: 2023-08-04 | Stop reason: HOSPADM

## 2023-08-04 RX ORDER — HYDROMORPHONE HYDROCHLORIDE 1 MG/ML
0.2 INJECTION, SOLUTION INTRAMUSCULAR; INTRAVENOUS; SUBCUTANEOUS EVERY 5 MIN PRN
Status: DISCONTINUED | OUTPATIENT
Start: 2023-08-04 | End: 2023-08-04

## 2023-08-04 RX ORDER — HYDROCODONE BITARTRATE AND ACETAMINOPHEN 5; 325 MG/1; MG/1
2 TABLET ORAL ONCE AS NEEDED
Status: DISCONTINUED | OUTPATIENT
Start: 2023-08-04 | End: 2023-08-04

## 2023-08-04 RX ORDER — HYDROMORPHONE HYDROCHLORIDE 1 MG/ML
0.6 INJECTION, SOLUTION INTRAMUSCULAR; INTRAVENOUS; SUBCUTANEOUS EVERY 5 MIN PRN
Status: DISCONTINUED | OUTPATIENT
Start: 2023-08-04 | End: 2023-08-04

## 2023-08-04 RX ORDER — DEXAMETHASONE SODIUM PHOSPHATE 4 MG/ML
VIAL (ML) INJECTION AS NEEDED
Status: DISCONTINUED | OUTPATIENT
Start: 2023-08-04 | End: 2023-08-04 | Stop reason: SURG

## 2023-08-04 RX ORDER — SCOLOPAMINE TRANSDERMAL SYSTEM 1 MG/1
1 PATCH, EXTENDED RELEASE TRANSDERMAL ONCE
Status: DISCONTINUED | OUTPATIENT
Start: 2023-08-04 | End: 2023-08-04 | Stop reason: HOSPADM

## 2023-08-04 RX ORDER — KETOROLAC TROMETHAMINE 30 MG/ML
INJECTION, SOLUTION INTRAMUSCULAR; INTRAVENOUS AS NEEDED
Status: DISCONTINUED | OUTPATIENT
Start: 2023-08-04 | End: 2023-08-04 | Stop reason: SURG

## 2023-08-04 RX ORDER — HYDROCODONE BITARTRATE AND ACETAMINOPHEN 5; 325 MG/1; MG/1
1 TABLET ORAL ONCE AS NEEDED
Status: DISCONTINUED | OUTPATIENT
Start: 2023-08-04 | End: 2023-08-04

## 2023-08-04 RX ORDER — ONDANSETRON 2 MG/ML
INJECTION INTRAMUSCULAR; INTRAVENOUS AS NEEDED
Status: DISCONTINUED | OUTPATIENT
Start: 2023-08-04 | End: 2023-08-04 | Stop reason: SURG

## 2023-08-04 RX ORDER — LIDOCAINE HYDROCHLORIDE 10 MG/ML
INJECTION, SOLUTION EPIDURAL; INFILTRATION; INTRACAUDAL; PERINEURAL AS NEEDED
Status: DISCONTINUED | OUTPATIENT
Start: 2023-08-04 | End: 2023-08-04 | Stop reason: SURG

## 2023-08-04 RX ORDER — HYDROMORPHONE HYDROCHLORIDE 1 MG/ML
0.4 INJECTION, SOLUTION INTRAMUSCULAR; INTRAVENOUS; SUBCUTANEOUS EVERY 5 MIN PRN
Status: DISCONTINUED | OUTPATIENT
Start: 2023-08-04 | End: 2023-08-04

## 2023-08-04 RX ORDER — METOCLOPRAMIDE HYDROCHLORIDE 5 MG/ML
10 INJECTION INTRAMUSCULAR; INTRAVENOUS EVERY 8 HOURS PRN
Status: DISCONTINUED | OUTPATIENT
Start: 2023-08-04 | End: 2023-08-04

## 2023-08-04 RX ORDER — ACETAMINOPHEN 500 MG
1000 TABLET ORAL ONCE AS NEEDED
Status: DISCONTINUED | OUTPATIENT
Start: 2023-08-04 | End: 2023-08-04

## 2023-08-04 RX ADMIN — ONDANSETRON 4 MG: 2 INJECTION INTRAMUSCULAR; INTRAVENOUS at 09:10:00

## 2023-08-04 RX ADMIN — DEXAMETHASONE SODIUM PHOSPHATE 4 MG: 4 MG/ML VIAL (ML) INJECTION at 08:40:00

## 2023-08-04 RX ADMIN — MIDAZOLAM HYDROCHLORIDE 2 MG: 1 INJECTION INTRAMUSCULAR; INTRAVENOUS at 08:37:00

## 2023-08-04 RX ADMIN — SODIUM CHLORIDE, SODIUM LACTATE, POTASSIUM CHLORIDE, CALCIUM CHLORIDE: 600; 310; 30; 20 INJECTION, SOLUTION INTRAVENOUS at 08:26:00

## 2023-08-04 RX ADMIN — LIDOCAINE HYDROCHLORIDE 50 MG: 10 INJECTION, SOLUTION EPIDURAL; INFILTRATION; INTRACAUDAL; PERINEURAL at 08:37:00

## 2023-08-04 RX ADMIN — KETOROLAC TROMETHAMINE 15 MG: 30 INJECTION, SOLUTION INTRAMUSCULAR; INTRAVENOUS at 09:09:00

## 2023-08-04 NOTE — OPERATIVE REPORT
[facility]  Operative Note    Gwynneth Anchors Patient Status:  Hospital Outpatient Surgery    4/15/1960 MRN [id number]   Location [address] [place] [address] Attending Khai Angulo MD   Hosp Day # 0 PCP Jessika Pickering DO     Pre-Operative Diagnosis: Postmenopausal bleeding [N95.0]  Endometrial polyp [N84.0]     Post-Operative Diagnosis: Postmenopausal bleeding [Q57. 0]Endometrial polyp [N84.0]      Procedure Performed:   Jodi Donohue, polypectomy    Surgeon(s) and Role:     * Khai Angulo MD - Primary     Surgical Findings: endometrial polyp in lower uterine segment     Specimen:   ID Type Source Tests Collected by Time Destination   1 :  Tissue Endometrial polyp SURGICAL PATHOLOGY TISSUE Khai Angulo MD 2023  9:07 AM          Estimated Blood Loss:   No data recorded    Complications: None      Procedure:   Patient was brought to the OR and monitored anesthesia care initiated. She was positioned in dorsal lithotomy and prepped and draped in usual sterile fashion. The bladder was emptied with red rubber catheter. Weighted speculum and right-angled retractor used to visualize the cervix and the anterior lip was grasped with an Allis clamp. The cervix was dilated to 7mm and sounded to 9cm. The hysteroscope was introduced and advanced to the endometrial cavity under direct visualization. Findings as above. The Truclear device was then used for polypectomy without difficulty. The hysteroscope was removed and tissue sent to pathology. Counts were correct at the end of procedure. Patient tolerated the procedure well and was brought to recovery in stable condition.     Deep Nj MD  [facility]  2023 9:33 AM

## 2023-08-04 NOTE — DISCHARGE INSTRUCTIONS
Home Care Instructions Following Your Hysteroscopy     Estuardo-  We hope you were pleased with your care at [facility].  We wish you the best outcome and overall experience with your operation. These instructions will help to minimize pain, limit the risk for an infection, and improve the likelihood of a successful result. What to Expect:  Expect some vaginal bleeding, watery vaginal discharge, or spotting for 1-2 weeks after your procedure  You might also experience abdominal cramping for 1-2 days  Call the office, if you experience heavy bleeding (saturating a pad every hour)    Over-The-Counter Medication  Non-prescription anti-inflammatory medications can also help to ease the pain.   You can take Aleve, Tylenol or Ibuprofen   Take as directed on the bottle  Drink a full glass of water with the medication    Bathing/Showers  You may resume showers in one day  No baths, swimming, hot tubs for two weeks    Home Medication  Resume your home medications as instructed    Diet  Resume your normal diet    Activity  Refrain from vaginal intercourse, vaginal suppositories, tampon use or douches until your first office appointment with the doctor  No strenuous activity or heavy lifting for two days  You may go up and down the stairs as tolerated    No physical exercise, sports, or gym workouts for two days    Return to Work or [employer] may return to work in two days  Contact your gynecologist's office if you need a medical note  You may return to school in two days with no restrictions    Driving  You may resume driving tomorrow    Follow-up Appointment with Your Gynecologist  Call your gynecologist's office today for an appointment in two weeks    The number is [phone]  Verify your appointment date, day, time, and location  At your postoperative office appointment, your progress will be evaluated, findings reviewed, and any additional concerns and instructions will be discussed    Questions or Concerns  Call the office if you experience severe pain not controlled by pain medication, swelling, heavy bleeding, foul smelling vaginal discharge, shortness of breath, chest pain, leg pain, fever (100.4 or greater), or other concerns  The number is: [phone]  If your call is made after office hours, the physician on call will be available to help you. There is always a doctor from the group covering our gynecology patients, when your provider is unavailable    Ashanti pompa for coming to [facility] for your operation. The nurses and the anesthesiologist try very hard to make sure you receive the best care possible. Your trust in them as well as us is greatly appreciated.   Thanks so much,  The Gynecologists or [facility]    You received a drug called Toradol which is an Anti Inflammatory at: 0909 am  If you are allowed to take Anti inflammatories:    Do not take any Anti Inflammatory like Motrin, Aleve or Ibuprophen until after: 0309 pm  Please report any suspected allergic reactions or bleeding issues to your doctor

## 2023-09-01 ENCOUNTER — OFFICE VISIT (OUTPATIENT)
Dept: OBGYN CLINIC | Facility: CLINIC | Age: 63
End: 2023-09-01
Payer: COMMERCIAL

## 2023-09-01 VITALS
RESPIRATION RATE: 18 BRPM | WEIGHT: 112 LBS | BODY MASS INDEX: 18.89 KG/M2 | HEART RATE: 65 BPM | DIASTOLIC BLOOD PRESSURE: 70 MMHG | SYSTOLIC BLOOD PRESSURE: 118 MMHG | HEIGHT: 64.5 IN

## 2023-09-01 DIAGNOSIS — Z09 POSTOPERATIVE EXAMINATION: Primary | ICD-10-CM

## 2023-09-01 DIAGNOSIS — Z98.890 STATUS POST HYSTEROSCOPIC POLYPECTOMY: ICD-10-CM

## 2023-09-01 PROCEDURE — 99212 OFFICE O/P EST SF 10 MIN: CPT | Performed by: OBSTETRICS & GYNECOLOGY

## 2023-10-02 ENCOUNTER — TELEPHONE (OUTPATIENT)
Dept: OBGYN CLINIC | Facility: CLINIC | Age: 63
End: 2023-10-02

## 2023-10-02 NOTE — TELEPHONE ENCOUNTER
s/p hysteroscopic polypectomy 8/4/2023. Post-op appointment completed 9/1/2023- normal.  Patient reports that she started noticing some minor cramping with light spotting about a week after her post-op appointment. Cramping and spotting seem to correlate with taking a bath. Patient bathes 3 times a week- can't take showers due to balance. Patient wears a panty liner daily- notices a few hours after every bath she will have minor cramps followed by red or dark red spotting. Patient denies intercourse or anything inserted into her vagina that could cause the spotting.   Patient states that she does not have spotting after any other activity- walking, weight lifting, etc.    Routed to Dr. Ronn Yan- please advise

## 2023-10-02 NOTE — TELEPHONE ENCOUNTER
Pt states that is noticing very light spotting after she takes a bath. She says she noticed it after her post op and doesn't have any issue with spotting with any other activities. She would like to know if it is necessary to come in for an appt.  Thank you

## 2023-10-03 NOTE — TELEPHONE ENCOUNTER
called concerned about wife. I did let him know that Dr. Dean Cabrera is out of the office until Thursday.

## 2023-10-06 NOTE — TELEPHONE ENCOUNTER
She can schedule an appointment to be examined as I am not sure why she would have spotting only after her baths.

## 2023-10-06 NOTE — TELEPHONE ENCOUNTER
Pt scheduled for an exam  Future Appointments   Date Time Provider Jessica Emani   10/25/2023 10:45 AM Shelbi Bsihop MD [facility] [place]   10/28/2023  8:30 AM [facility] NURSE PAYTON Vasquez

## 2023-10-06 NOTE — TELEPHONE ENCOUNTER
Routed to [facility]- please assist patient with scheduling appointment per Dr. Iván Aguayo.

## 2023-10-20 ENCOUNTER — TELEPHONE (OUTPATIENT)
Dept: FAMILY MEDICINE CLINIC | Facility: CLINIC | Age: 63
End: 2023-10-20

## 2023-10-20 NOTE — TELEPHONE ENCOUNTER
Last colonoscopy at [facility] on 2/8/21 - no results noted or return recommendations

## 2023-10-20 NOTE — TELEPHONE ENCOUNTER
Dr. Reva Litten saw her  Report downloaded in Care - everywhere  Recommendation:  Repeat colonoscopy in 5 years for surveillance.

## 2023-11-28 ENCOUNTER — IMMUNIZATION (OUTPATIENT)
Dept: FAMILY MEDICINE CLINIC | Facility: CLINIC | Age: 63
End: 2023-11-28
Payer: COMMERCIAL

## 2023-11-28 DIAGNOSIS — Z23 NEED FOR VACCINATION: Primary | ICD-10-CM

## 2023-11-28 PROCEDURE — 90471 IMMUNIZATION ADMIN: CPT | Performed by: FAMILY MEDICINE

## 2023-11-28 PROCEDURE — 90662 IIV NO PRSV INCREASED AG IM: CPT | Performed by: FAMILY MEDICINE

## 2024-01-31 ENCOUNTER — OFFICE VISIT (OUTPATIENT)
Dept: FAMILY MEDICINE CLINIC | Facility: CLINIC | Age: 64
End: 2024-01-31
Payer: COMMERCIAL

## 2024-01-31 VITALS
SYSTOLIC BLOOD PRESSURE: 124 MMHG | RESPIRATION RATE: 16 BRPM | TEMPERATURE: 98 F | WEIGHT: 107 LBS | OXYGEN SATURATION: 97 % | DIASTOLIC BLOOD PRESSURE: 82 MMHG | HEART RATE: 71 BPM | BODY MASS INDEX: 18 KG/M2

## 2024-01-31 DIAGNOSIS — M67.471 GANGLION CYST OF RIGHT FOOT: Primary | ICD-10-CM

## 2024-01-31 PROCEDURE — 99213 OFFICE O/P EST LOW 20 MIN: CPT | Performed by: FAMILY MEDICINE

## 2024-01-31 PROCEDURE — 3074F SYST BP LT 130 MM HG: CPT | Performed by: FAMILY MEDICINE

## 2024-01-31 PROCEDURE — 3079F DIAST BP 80-89 MM HG: CPT | Performed by: FAMILY MEDICINE

## 2024-01-31 NOTE — PROGRESS NOTES
Estuardo Boyce is a 63 year old female.  HPI:   Estuardo is here for discussion of issues with her right foot and ankle, has an AFO brace and its relatively new, and has been having issues with it since she's had it . Developed a painful bump on the lateral aspect  of the right foot/ankle, no trauma.  Current Outpatient Medications   Medication Sig Dispense Refill    levetiracetam 750 MG Oral Tab Take 1 tablet (750 mg total) by mouth 2 (two) times daily. (Patient taking differently: Take 2 tablets (1,500 mg total) by mouth 2 (two) times daily.) 180 tablet 3    diphenhydrAMINE 25 MG Oral Cap Take 1 capsule (25 mg total) by mouth as needed.      aspirin 81 MG Oral Chew Tab Chew 1 tablet (81 mg total) by mouth daily.      Multiple Vitamin (MULTIVITAMIN OR) Take by mouth daily.        Past Medical History:   Diagnosis Date    Migraines     Seizure disorder (HCC)     Traumatic brain injury (HCC)       Social History:  Social History     Socioeconomic History    Marital status:    Tobacco Use    Smoking status: Former    Smokeless tobacco: Never    Tobacco comments:     Quit 16 yrs    Vaping Use    Vaping Use: Never used   Substance and Sexual Activity    Alcohol use: Not Currently    Drug use: Not Currently    Sexual activity: Not Currently   Other Topics Concern    Caffeine Concern No    Exercise No    Seat Belt Yes        REVIEW OF SYSTEMS:   GENERAL HEALTH: feels well otherwise  SKIN: denies any unusual skin lesions or rashes  RESPIRATORY: denies shortness of breath with exertion  CARDIOVASCULAR: denies chest pain on exertion  GI: denies abdominal pain and denies heartburn  NEURO: denies headaches  EXT: has areas of redness on the right lateral aspect of the foot.  EXAM:   /82   Pulse 71   Temp 97.8 °F (36.6 °C) (Temporal)   Resp 16   Wt 107 lb (48.5 kg)   SpO2 97%   BMI 18.08 kg/m²   GENERAL: well developed, well nourished,in no apparent distress  SKIN: no rashes,no suspicious  lesions  EXTREMITIES: no cyanosis, clubbing or edema, there is a ? Ganglion on the lateral aspect of the right foot that is calloused and firm, also has an area of erythema on the right 5th metatarsal base.     ASSESSMENT AND PLAN:     Encounter Diagnosis   Name Primary?    Ganglion cyst of right foot Yes             Imaging & Consults:  PODIATRY - INTERNAL     The patient indicates understanding of these issues and agrees to the plan.  The patient is asked to return in after she see podiatry.

## 2024-02-21 ENCOUNTER — OFFICE VISIT (OUTPATIENT)
Dept: FAMILY MEDICINE CLINIC | Facility: CLINIC | Age: 64
End: 2024-02-21
Payer: COMMERCIAL

## 2024-02-21 VITALS
OXYGEN SATURATION: 99 % | HEART RATE: 66 BPM | BODY MASS INDEX: 19.58 KG/M2 | RESPIRATION RATE: 16 BRPM | TEMPERATURE: 98 F | SYSTOLIC BLOOD PRESSURE: 112 MMHG | HEIGHT: 63 IN | WEIGHT: 110.5 LBS | DIASTOLIC BLOOD PRESSURE: 86 MMHG

## 2024-02-21 DIAGNOSIS — M81.0 POST-MENOPAUSAL OSTEOPOROSIS: ICD-10-CM

## 2024-02-21 DIAGNOSIS — G12.29 PSEUDOBULBAR PALSY (HCC): ICD-10-CM

## 2024-02-21 DIAGNOSIS — G40.209: ICD-10-CM

## 2024-02-21 DIAGNOSIS — Z12.31 VISIT FOR SCREENING MAMMOGRAM: ICD-10-CM

## 2024-02-21 DIAGNOSIS — R26.9 GAIT DISTURBANCE: ICD-10-CM

## 2024-02-21 DIAGNOSIS — Z11.59 NEED FOR HEPATITIS C SCREENING TEST: ICD-10-CM

## 2024-02-21 DIAGNOSIS — Z13.820 OSTEOPOROSIS SCREENING: ICD-10-CM

## 2024-02-21 DIAGNOSIS — Z13.6 SCREENING FOR CARDIOVASCULAR CONDITION: ICD-10-CM

## 2024-02-21 DIAGNOSIS — Z00.00 ENCOUNTER FOR ANNUAL HEALTH EXAMINATION: ICD-10-CM

## 2024-02-21 DIAGNOSIS — Z23 NEED FOR VACCINATION: ICD-10-CM

## 2024-02-21 DIAGNOSIS — Z00.00 ENCOUNTER FOR MEDICARE ANNUAL WELLNESS EXAM: ICD-10-CM

## 2024-02-21 DIAGNOSIS — Z00.00 MEDICARE ANNUAL WELLNESS VISIT, INITIAL: Primary | ICD-10-CM

## 2024-02-21 LAB
ALBUMIN SERPL-MCNC: 4.1 G/DL (ref 3.4–5)
ALBUMIN/GLOB SERPL: 1.1 {RATIO} (ref 1–2)
ALP LIVER SERPL-CCNC: 86 U/L
ALT SERPL-CCNC: 21 U/L
ANION GAP SERPL CALC-SCNC: 6 MMOL/L (ref 0–18)
AST SERPL-CCNC: 14 U/L (ref 15–37)
BASOPHILS # BLD AUTO: 0.05 X10(3) UL (ref 0–0.2)
BASOPHILS NFR BLD AUTO: 0.6 %
BILIRUB SERPL-MCNC: 0.5 MG/DL (ref 0.1–2)
BUN BLD-MCNC: 12 MG/DL (ref 9–23)
CALCIUM BLD-MCNC: 10.1 MG/DL (ref 8.5–10.1)
CHLORIDE SERPL-SCNC: 109 MMOL/L (ref 98–112)
CHOLEST SERPL-MCNC: 185 MG/DL (ref ?–200)
CO2 SERPL-SCNC: 28 MMOL/L (ref 21–32)
CREAT BLD-MCNC: 1.05 MG/DL
EGFRCR SERPLBLD CKD-EPI 2021: 59 ML/MIN/1.73M2 (ref 60–?)
EOSINOPHIL # BLD AUTO: 0.1 X10(3) UL (ref 0–0.7)
EOSINOPHIL NFR BLD AUTO: 1.3 %
ERYTHROCYTE [DISTWIDTH] IN BLOOD BY AUTOMATED COUNT: 12.2 %
FASTING PATIENT LIPID ANSWER: YES
FASTING STATUS PATIENT QL REPORTED: YES
GLOBULIN PLAS-MCNC: 3.6 G/DL (ref 2.8–4.4)
GLUCOSE BLD-MCNC: 99 MG/DL (ref 70–99)
HCT VFR BLD AUTO: 44.4 %
HCV AB SERPL QL IA: NONREACTIVE
HDLC SERPL-MCNC: 50 MG/DL (ref 40–59)
HGB BLD-MCNC: 14.8 G/DL
IMM GRANULOCYTES # BLD AUTO: 0.02 X10(3) UL (ref 0–1)
IMM GRANULOCYTES NFR BLD: 0.3 %
LDLC SERPL CALC-MCNC: 116 MG/DL (ref ?–100)
LYMPHOCYTES # BLD AUTO: 2.81 X10(3) UL (ref 1–4)
LYMPHOCYTES NFR BLD AUTO: 36.4 %
MCH RBC QN AUTO: 30 PG (ref 26–34)
MCHC RBC AUTO-ENTMCNC: 33.3 G/DL (ref 31–37)
MCV RBC AUTO: 89.9 FL
MONOCYTES # BLD AUTO: 0.51 X10(3) UL (ref 0.1–1)
MONOCYTES NFR BLD AUTO: 6.6 %
NEUTROPHILS # BLD AUTO: 4.24 X10 (3) UL (ref 1.5–7.7)
NEUTROPHILS # BLD AUTO: 4.24 X10(3) UL (ref 1.5–7.7)
NEUTROPHILS NFR BLD AUTO: 54.8 %
NONHDLC SERPL-MCNC: 135 MG/DL (ref ?–130)
OSMOLALITY SERPL CALC.SUM OF ELEC: 296 MOSM/KG (ref 275–295)
PLATELET # BLD AUTO: 283 10(3)UL (ref 150–450)
POTASSIUM SERPL-SCNC: 5.7 MMOL/L (ref 3.5–5.1)
PROT SERPL-MCNC: 7.7 G/DL (ref 6.4–8.2)
RBC # BLD AUTO: 4.94 X10(6)UL
SODIUM SERPL-SCNC: 143 MMOL/L (ref 136–145)
TRIGL SERPL-MCNC: 103 MG/DL (ref 30–149)
TSI SER-ACNC: 2.07 MIU/ML (ref 0.36–3.74)
VLDLC SERPL CALC-MCNC: 18 MG/DL (ref 0–30)
WBC # BLD AUTO: 7.7 X10(3) UL (ref 4–11)

## 2024-02-21 PROCEDURE — 80061 LIPID PANEL: CPT | Performed by: FAMILY MEDICINE

## 2024-02-21 PROCEDURE — 3079F DIAST BP 80-89 MM HG: CPT | Performed by: FAMILY MEDICINE

## 2024-02-21 PROCEDURE — 86803 HEPATITIS C AB TEST: CPT | Performed by: FAMILY MEDICINE

## 2024-02-21 PROCEDURE — 90471 IMMUNIZATION ADMIN: CPT | Performed by: FAMILY MEDICINE

## 2024-02-21 PROCEDURE — 85025 COMPLETE CBC W/AUTO DIFF WBC: CPT | Performed by: FAMILY MEDICINE

## 2024-02-21 PROCEDURE — 3008F BODY MASS INDEX DOCD: CPT | Performed by: FAMILY MEDICINE

## 2024-02-21 PROCEDURE — G0438 PPPS, INITIAL VISIT: HCPCS | Performed by: FAMILY MEDICINE

## 2024-02-21 PROCEDURE — 84443 ASSAY THYROID STIM HORMONE: CPT | Performed by: FAMILY MEDICINE

## 2024-02-21 PROCEDURE — 3074F SYST BP LT 130 MM HG: CPT | Performed by: FAMILY MEDICINE

## 2024-02-21 PROCEDURE — 80053 COMPREHEN METABOLIC PANEL: CPT | Performed by: FAMILY MEDICINE

## 2024-02-21 PROCEDURE — 90677 PCV20 VACCINE IM: CPT | Performed by: FAMILY MEDICINE

## 2024-02-21 NOTE — PROGRESS NOTES
Subjective:   Estuardo Boyce is a 63 year old female who presents for a Medicare Initial Preventative Physical Exam (Welcome to Medicare- < 12 months on Medicare) and scheduled follow up of multiple significant but stable problems.   Estuardo is here for her MWV,  she is due for DEXA, and labs and mammogram, she has issues with her left great toe. But only the left great toe, does not change colors and has to wear 3 pairs of sick, has some issue with her right. Hand being cold all the time.  Also notes she is having issues with her gait being unsteady as a result of her neurological issues.     History/Other:   Fall Risk Assessment:   She has been screened for Falls and is low risk.      Cognitive Assessment:   Abnormal  What day of the week is this?: Correct  What month is it?: Correct  What year is it?: Correct  Recall \"Ball\": Correct (apple)  Recall \"Flag\": Incorrect (flower)  Recall \"Tree\": Correct (plane)    Functional Ability/Status:   Estuardo Boyce has some abnormal functions as listed below:  She has Driving difficulties based on screening of functional status. She has difficulties Shopping for Groceries based on screening of functional status. She has Walking problems based on screening of functional status.       Depression Screening (PHQ-2/PHQ-9): PHQ-2 SCORE: 0  , done 2/21/2024             Advanced Directives:   She does NOT have a Living Will. [Do you have a living will?: No]  She does NOT have a Power of  for Health Care. [Do you have a healthcare power of ?: No]  Discussed Advance Care Planning with patient (and family/surrogate if present). Standard forms made available to patient in After Visit Summary.      Patient Active Problem List   Diagnosis    Complex partial epilepsy without status epilepticus (HCC)    Pseudobulbar palsy (HCC)    Postmenopausal bleeding     Allergies:  She is allergic to atorvastatin and lamotrigine.    Current Medications:  Outpatient  Medications Marked as Taking for the 24 encounter (Office Visit) with Олег Haywood DO   Medication Sig    levetiracetam 750 MG Oral Tab Take 1 tablet (750 mg total) by mouth 2 (two) times daily. (Patient taking differently: Take 2 tablets (1,500 mg total) by mouth 2 (two) times daily.)    diphenhydrAMINE 25 MG Oral Cap Take 1 capsule (25 mg total) by mouth as needed.    aspirin 81 MG Oral Chew Tab Chew 1 tablet (81 mg total) by mouth daily.    Multiple Vitamin (MULTIVITAMIN OR) Take by mouth daily.       Medical History:  She  has a past medical history of Migraines, Seizure disorder (HCC), and Traumatic brain injury (HCC).  Surgical History:  She  has a past surgical history that includes colonoscopy and .   Family History:  Her family history includes Stroke in her father.  Social History:  She  reports that she has quit smoking. She has never used smokeless tobacco. She reports that she does not currently use alcohol. She reports that she does not currently use drugs.    Tobacco:  She smoked tobacco in the past but quit greater than 12 months ago.  Social History    Tobacco Use      Smoking status: Former      Smokeless tobacco: Never      Tobacco comments: Quit 16 yrs          CAGE Alcohol Screen:         Patient Care Team:  Олег Haywood DO as PCP - General (Family Medicine)  Thai Santoyo MD as Consulting Physician (NEUROLOGY)  Paul Alexis MD as Consulting Physician (NEUROLOGY)    Review of Systems  GENERAL: feels well otherwise  SKIN: denies any unusual skin lesions  EYES: denies blurred vision or double vision  HEENT: denies nasal congestion, sinus pain or ST  LUNGS: denies shortness of breath with exertion  CARDIOVASCULAR: denies chest pain on exertion, has circulation issues cold right hand, left great toe cold all the time  GI: denies abdominal pain, denies heartburn  : denies dysuria, vaginal discharge or itching, no complaint of urinary incontinence   MUSCULOSKELETAL: denies  back pain  NEURO: denies headaches, Hx of Pseudobulbar palsy, gait disturbance  PSYCHE: denies depression or anxiety  HEMATOLOGIC: denies hx of anemia  ENDOCRINE: denies thyroid history  ALL/ASTHMA: denies hx of allergy or asthma    Objective:   Physical Exam  General Appearance:  Alert, cooperative, no distress, appears stated age   Head:  Normocephalic, without obvious abnormality, atraumatic   Eyes:  PERRL, conjunctiva/corneas clear, EOM's intact both eyes   Ears:  Normal TM's and external ear canals, both ears   Nose: Nares normal, septum midline,mucosa normal, no drainage or sinus tenderness   Throat: Lips, mucosa, and tongue normal; teeth and gums normal   Neck: Supple, symmetrical, trachea midline, no adenopathy;  thyroid: not enlarged, symmetric, no tenderness/mass/nodules; no carotid bruit or JVD   Back:   Symmetric, no curvature, ROM normal, no CVA tenderness   Lungs:   Clear to auscultation bilaterally, respirations unlabored   Heart:  Regular rate and rhythm, S1 and S2 normal, no murmur, rub, or gallop   Abdomen:   Soft, non-tender, bowel sounds active all four quadrants,  no masses, no organomegaly   Pelvic: Deferred   Extremities: Extremities normal, atraumatic, no cyanosis or edema, has good distal pulses, the right hand is cool the left is normal in temperature   Pulses: 2+ and symmetric   Skin: Skin color, texture, turgor normal, no rashes or lesions   Lymph nodes: Cervical, supraclavicular, and axillary nodes normal   Neurologic: Motor is 4/5, gait is unsteady has an AFO on the right foot       /86   Pulse 66   Temp 98.1 °F (36.7 °C) (Temporal)   Resp 16   Ht 5' 3\" (1.6 m)   Wt 110 lb 8 oz (50.1 kg)   SpO2 99%   BMI 19.57 kg/m²  Estimated body mass index is 19.57 kg/m² as calculated from the following:    Height as of this encounter: 5' 3\" (1.6 m).    Weight as of this encounter: 110 lb 8 oz (50.1 kg).    Medicare Hearing Assessment:   Hearing Screening    Time taken: 2/21/2024  9:03  AM  Screening Method: Finger Rub  Finger Rub Result: Pass               Visual Acuity:   Right Eye Visual Acuity: Corrected Right Eye Chart Acuity: 20/50   Left Eye Visual Acuity: Corrected Left Eye Chart Acuity: 20/50   Both Eyes Visual Acuity: Corrected Both Eyes Chart Acuity: 20/50   Able To Tolerate Visual Acuity: Yes        Assessment & Plan:   Estuardo Boyce is a 63 year old female who presents for a Medicare Assessment.     1. Medicare annual wellness visit, initial (Primary)  -     PCV20 (Prevnar 20)  -     Lipid Panel; Future; Expected date: 02/21/2024  -     HCV Antibody; Future; Expected date: 02/21/2024  -     3D Mammogram Digital Screen, Bilateral (CPT=77067/82117); Future; Expected date: 02/21/2024  -     Comp Metabolic Panel (14); Future; Expected date: 02/21/2024  -     TSH W Reflex To Free T4; Future; Expected date: 02/21/2024  -     CBC With Differential With Platelet; Future; Expected date: 02/21/2024  2. Complex partial epilepsy without status epilepticus (HCC)  -     Comp Metabolic Panel (14); Future; Expected date: 02/21/2024  -     TSH W Reflex To Free T4; Future; Expected date: 02/21/2024, appear to be stable, I wonder if her gait issues are related to medication possibly  3. Pseudobulbar palsy (HCC)  -     Comp Metabolic Panel (14); Future; Expected date: 02/21/2024  -     TSH W Reflex To Free T4; Future; Expected date: 02/21/2024, stable  4. Screening for cardiovascular condition  -     Lipid Panel; Future; Expected date: 02/21/2024  5. Visit for screening mammogram  -     3D Mammogram Digital Screen, Bilateral (CPT=77067/67736); Future; Expected date: 02/21/2024  6. Encounter for annual health examination  -     PCV20 (Prevnar 20)  -     Lipid Panel; Future; Expected date: 02/21/2024  -     HCV Antibody; Future; Expected date: 02/21/2024  -     3D Mammogram Digital Screen, Bilateral (CPT=77067/00646); Future; Expected date: 02/21/2024  -     Comp Metabolic Panel (14); Future;  Expected date: 02/21/2024  -     TSH W Reflex To Free T4; Future; Expected date: 02/21/2024  -     CBC With Differential With Platelet; Future; Expected date: 02/21/2024  7. Encounter for Medicare annual wellness exam  -     PCV20 (Prevnar 20)  -     Lipid Panel; Future; Expected date: 02/21/2024  -     HCV Antibody; Future; Expected date: 02/21/2024  -     3D Mammogram Digital Screen, Bilateral (CPT=77067/42217); Future; Expected date: 02/21/2024  -     Comp Metabolic Panel (14); Future; Expected date: 02/21/2024  -     TSH W Reflex To Free T4; Future; Expected date: 02/21/2024  -     CBC With Differential With Platelet; Future; Expected date: 02/21/2024  8. Need for vaccination  -     PCV20 (Prevnar 20)  9. Need for hepatitis C screening test  -     HCV Antibody; Future; Expected date: 02/21/2024    The patient indicates understanding of these issues and agrees to the plan.  Reinforced healthy diet, lifestyle, and exercise.      Return in 1 year (on 2/21/2025).     Олег Haywood DO, 2/21/2024     Supplementary Documentation:   General Health:  In the past six months, have you lost more than 10 pounds without trying?: 2 - No  Has your appetite been poor?: No  Type of Diet: Other  How does the patient maintain a good energy level?: Appropriate Exercise;Daily Walks  How would you describe your daily physical activity?: Light  How would you describe your current health state?: Good  On a scale of 0 to 10, with 0 being no pain and 10 being severe pain, what is your pain level?: 3 - (Mild)  In the past six months, have you experienced urine leakage?: 0-No  Have you had any immunizations at another office such as Influenza, Hepatitis B, Tetanus, or Pneumococcal?: No         Estuardo Boyce's SCREENING SCHEDULE   Tests on this list are recommended by your physician but may not be covered, or covered at this frequency, by your insurer.   Please check with your insurance carrier before scheduling to verify  coverage.   PREVENTATIVE SERVICES FREQUENCY &  COVERAGE DETAILS LAST COMPLETION DATE   Diabetes Screening    Fasting Blood Sugar /  Glucose    One screening every 12 months if never tested or if previously tested but not diagnosed with pre-diabetes   One screening every 6 months if diagnosed with pre-diabetes No results found for: \"GLUCOSE\", \"GLU\"     Cardiovascular Disease Screening    Lipid Panel  Cholesterol  Lipoprotein (HDL)  Triglycerides Covered every 5 years for all Medicare beneficiaries without apparent signs or symptoms of cardiovascular disease No results found for: \"CHOLEST\", \"HDL\", \"LDL\", \"LDLD\", \"LDLC\", \"TRIG\"      Electrocardiogram (EKG)   Covered if needed at Welcome to Medicare, and non-screening if indicated for medical reasons -      Ultrasound Screening for Abdominal Aortic Aneurysm (AAA) Covered once in a lifetime for one of the following risk factors    Men who are 65-75 years old and have ever smoked    Anyone with a family history -     Colorectal Cancer Screening  Covered for ages 50-85; only need ONE of the following:    Colonoscopy   Covered every 10 years    Covered every 2 years if patient is at high risk or previous colonoscopy was abnormal 02/08/2021    Health Maintenance   Topic Date Due    Colorectal Cancer Screening  02/08/2026       Flexible Sigmoidoscopy   Covered every 4 years -    Fecal Occult Blood Test Covered annually -   Bone Density Screening    Bone density screening    Covered every 2 years after age 65 if diagnosed with risk of osteoporosis or estrogen deficiency.    Covered yearly for long-term glucocorticoid medication use (Steroids) No results found for this or any previous visit.      Health Maintenance Due   Topic Date Due    DEXA Scan  Never done      Pap and Pelvic    Pap   Covered every 2 years for women at normal risk; Annually if at high risk 04/06/2023  Health Maintenance   Topic Date Due    Pap Smear  04/06/2026       Chlamydia Annually if high risk -  No  recommendations at this time   Screening Mammogram    Mammogram     Recommend annually for all female patients aged 40 and older    One baseline mammogram covered for patients aged 35-39 06/09/2023    Health Maintenance   Topic Date Due    Mammogram  06/09/2024       Immunizations    Influenza Covered once per flu season  Please get every year 11/28/2023  No recommendations at this time    Pneumococcal Each vaccine ([id number] & [id number]) covered once after 65 Prevnar 13: -    [id number]: -     No recommendations at this time    Hepatitis B One screening covered for patients with certain risk factors   -  No recommendations at this time    Tetanus Toxoid Not covered by Medicare Part B unless medically necessary (cut with metal); may be covered with your pharmacy prescription benefits 10/02/2019    Tetanus, Diptheria and Pertusis TD and TDaP Not covered by Medicare Part B -  No recommendations at this time    Zoster Not covered by Medicare Part B; may be covered with your pharmacy  prescription benefits -  Zoster Vaccines(1 of 2) Never done     Annual Monitoring of Persistent Medications (ACE/ARB, digoxin diuretics, anticonvulsants)    Potassium Annually No results found for: \"K\", \"POTASSIUM\"      Creatinine   Annually No results found for: \"CREATININE\", \"CREATSERUM\"      BUN Annually No results found for: \"BUN\"    Drug Serum Conc Annually No results found for: \"DIGOXIN\", \"DIG\", \"VALP\"

## 2024-02-21 NOTE — PATIENT INSTRUCTIONS
Estuardo Boyce's SCREENING SCHEDULE   Tests on this list are recommended by your physician but may not be covered, or covered at this frequency, by your insurer.   Please check with your insurance carrier before scheduling to verify coverage.   PREVENTATIVE SERVICES FREQUENCY &  COVERAGE DETAILS LAST COMPLETION DATE   Diabetes Screening    Fasting Blood Sugar /  Glucose    One screening every 12 months if never tested or if previously tested but not diagnosed with pre-diabetes   One screening every 6 months if diagnosed with pre-diabetes No results found for: \"GLUCOSE\", \"GLU\"     Cardiovascular Disease Screening    Lipid Panel  Cholesterol  Lipoprotein (HDL)  Triglycerides Covered every 5 years for all Medicare beneficiaries without apparent signs or symptoms of cardiovascular disease No results found for: \"CHOLEST\", \"HDL\", \"LDL\", \"LDLD\", \"LDLC\", \"TRIG\"      Electrocardiogram (EKG)   Covered if needed at Welcome to Medicare, and non-screening if indicated for medical reasons -      Ultrasound Screening for Abdominal Aortic Aneurysm (AAA) Covered once in a lifetime for one of the following risk factors   • Men who are 65-75 years old and have ever smoked   • Anyone with a family history -     Colorectal Cancer Screening  Covered for ages 50-85; only need ONE of the following:    Colonoscopy   Covered every 10 years    Covered every 2 years if patient is at high risk or previous colonoscopy was abnormal 02/08/2021    Health Maintenance   Topic Date Due   • Colorectal Cancer Screening  02/08/2026       Flexible Sigmoidoscopy   Covered every 4 years -    Fecal Occult Blood Test Covered annually -   Bone Density Screening    Bone density screening    Covered every 2 years after age 65 if diagnosed with risk of osteoporosis or estrogen deficiency.    Covered yearly for long-term glucocorticoid medication use (Steroids) No results found for this or any previous visit.      Health Maintenance Due   Topic Date Due    • DEXA Scan  Never done      Pap and Pelvic    Pap   Covered every 2 years for women at normal risk; Annually if at high risk 04/06/2023  Health Maintenance   Topic Date Due   • Pap Smear  04/06/2026       Chlamydia Annually if high risk -  No recommendations at this time   Screening Mammogram    Mammogram     Recommend annually for all female patients aged 40 and older    One baseline mammogram covered for patients aged 35-39 06/09/2023    Health Maintenance   Topic Date Due   • Mammogram  06/09/2024       Immunizations    Influenza Covered once per flu season  Please get every year 11/28/2023  No recommendations at this time    Pneumococcal Each vaccine ([id number] & [id number]) covered once after 65 Prevnar 13: -    [id number]: -     Pneumococcal Vaccination(1 of 1 - PCV) Never done    Hepatitis B One screening covered for patients with certain risk factors   -  No recommendations at this time    Tetanus Toxoid Not covered by Medicare Part B unless medically necessary (cut with metal); may be covered with your pharmacy prescription benefits 10/02/2019    Tetanus, Diptheria and Pertusis TD and TDaP Not covered by Medicare Part B -  No recommendations at this time    Zoster Not covered by Medicare Part B; may be covered with your pharmacy  prescription benefits -  Zoster Vaccines(1 of 2) Never done     Annual Monitoring of Persistent Medications (ACE/ARB, digoxin diuretics, anticonvulsants)    Potassium Annually No results found for: \"K\", \"POTASSIUM\"      Creatinine   Annually No results found for: \"CREATININE\", \"CREATSERUM\"      BUN Annually No results found for: \"BUN\"    Drug Serum Conc Annually No results found for: \"DIGOXIN\", \"DIG\", \"VALP\"

## 2024-03-19 ENCOUNTER — TELEPHONE (OUTPATIENT)
Dept: FAMILY MEDICINE CLINIC | Facility: CLINIC | Age: 64
End: 2024-03-19

## 2024-03-19 NOTE — TELEPHONE ENCOUNTER
Pt states she is having foot surgery on 4/10 and she is having a bone density test on 3/30    Dr. Yury Blum    Future Appointments   Date Time Provider Department Center   3/25/2024  5:20 PM Олег Haywood DO [facility] [place]   3/30/2024  2:20 PM PF DEXA 1 PF D[facility] [place]   4/15/2024  4:15 PM Edwin, Latosha, PT [facility] [place]   4/22/2024  4:15 PM Edwin, Latosha, PT [facility] [place]   4/29/2024  4:15 PM Edwin, Latosha, PT [facility] [place]   5/2/2024  4:15 PM Edwin, Latosha, PT [facility] [place]   5/6/2024  4:15 PM Edwin, Latosha, PT [facility] [place]   5/9/2024  4:15 PM Edwin, Latosha, PT [facility] [place]   5/13/2024  4:15 PM Edwin, Latosha, PT [facility] [place]   5/16/2024  4:15 PM Edwin, Latosha, PT [facility] [place]   5/20/2024  4:15 PM Edwin, Latosha, PT [facility] [place]   5/23/2024  4:15 PM Edwin, Latosha, PT [facility] [place]   5/30/2024  4:15 PM Edwin, Latosha, PT [facility] [place]

## 2024-03-25 ENCOUNTER — OFFICE VISIT (OUTPATIENT)
Dept: FAMILY MEDICINE CLINIC | Facility: CLINIC | Age: 64
End: 2024-03-25
Payer: COMMERCIAL

## 2024-03-25 VITALS
DIASTOLIC BLOOD PRESSURE: 80 MMHG | HEART RATE: 60 BPM | WEIGHT: 110.63 LBS | SYSTOLIC BLOOD PRESSURE: 126 MMHG | BODY MASS INDEX: 20 KG/M2 | RESPIRATION RATE: 18 BRPM | OXYGEN SATURATION: 98 % | TEMPERATURE: 98 F

## 2024-03-25 DIAGNOSIS — G40.209: Primary | ICD-10-CM

## 2024-03-25 DIAGNOSIS — G12.29 PSEUDOBULBAR PALSY (HCC): ICD-10-CM

## 2024-03-25 DIAGNOSIS — Z87.820 HISTORY OF TRAUMATIC BRAIN INJURY: ICD-10-CM

## 2024-03-25 DIAGNOSIS — Z01.818 PREOP EXAMINATION: ICD-10-CM

## 2024-03-25 DIAGNOSIS — M89.8X7 EXOSTOSIS OF RIGHT FOOT: ICD-10-CM

## 2024-03-25 DIAGNOSIS — R56.9 SEIZURES (HCC): Primary | ICD-10-CM

## 2024-03-25 LAB
ALBUMIN SERPL-MCNC: 3.9 G/DL (ref 3.4–5)
ALBUMIN/GLOB SERPL: 1 {RATIO} (ref 1–2)
ALP LIVER SERPL-CCNC: 92 U/L
ALT SERPL-CCNC: 25 U/L
ANION GAP SERPL CALC-SCNC: 5 MMOL/L (ref 0–18)
AST SERPL-CCNC: 16 U/L (ref 15–37)
BASOPHILS # BLD AUTO: 0.07 X10(3) UL (ref 0–0.2)
BASOPHILS NFR BLD AUTO: 1 %
BILIRUB SERPL-MCNC: 0.2 MG/DL (ref 0.1–2)
BUN BLD-MCNC: 11 MG/DL (ref 9–23)
CALCIUM BLD-MCNC: 9.4 MG/DL (ref 8.5–10.1)
CHLORIDE SERPL-SCNC: 107 MMOL/L (ref 98–112)
CO2 SERPL-SCNC: 29 MMOL/L (ref 21–32)
CREAT BLD-MCNC: 0.73 MG/DL
EGFRCR SERPLBLD CKD-EPI 2021: 91 ML/MIN/1.73M2 (ref 60–?)
EOSINOPHIL # BLD AUTO: 0.13 X10(3) UL (ref 0–0.7)
EOSINOPHIL NFR BLD AUTO: 1.8 %
ERYTHROCYTE [DISTWIDTH] IN BLOOD BY AUTOMATED COUNT: 12.2 %
FASTING STATUS PATIENT QL REPORTED: NO
GLOBULIN PLAS-MCNC: 3.8 G/DL (ref 2.8–4.4)
GLUCOSE BLD-MCNC: 106 MG/DL (ref 70–99)
HCT VFR BLD AUTO: 43.8 %
HGB BLD-MCNC: 14.4 G/DL
IMM GRANULOCYTES # BLD AUTO: 0.01 X10(3) UL (ref 0–1)
IMM GRANULOCYTES NFR BLD: 0.1 %
LYMPHOCYTES # BLD AUTO: 3.31 X10(3) UL (ref 1–4)
LYMPHOCYTES NFR BLD AUTO: 45.4 %
MCH RBC QN AUTO: 29.6 PG (ref 26–34)
MCHC RBC AUTO-ENTMCNC: 32.9 G/DL (ref 31–37)
MCV RBC AUTO: 90.1 FL
MONOCYTES # BLD AUTO: 0.66 X10(3) UL (ref 0.1–1)
MONOCYTES NFR BLD AUTO: 9.1 %
NEUTROPHILS # BLD AUTO: 3.11 X10 (3) UL (ref 1.5–7.7)
NEUTROPHILS # BLD AUTO: 3.11 X10(3) UL (ref 1.5–7.7)
NEUTROPHILS NFR BLD AUTO: 42.6 %
OSMOLALITY SERPL CALC.SUM OF ELEC: 292 MOSM/KG (ref 275–295)
PLATELET # BLD AUTO: 272 10(3)UL (ref 150–450)
POTASSIUM SERPL-SCNC: 5.1 MMOL/L (ref 3.5–5.1)
PROT SERPL-MCNC: 7.7 G/DL (ref 6.4–8.2)
RBC # BLD AUTO: 4.86 X10(6)UL
SODIUM SERPL-SCNC: 141 MMOL/L (ref 136–145)
WBC # BLD AUTO: 7.3 X10(3) UL (ref 4–11)

## 2024-03-25 PROCEDURE — 3074F SYST BP LT 130 MM HG: CPT | Performed by: FAMILY MEDICINE

## 2024-03-25 PROCEDURE — 93000 ELECTROCARDIOGRAM COMPLETE: CPT | Performed by: FAMILY MEDICINE

## 2024-03-25 PROCEDURE — 3079F DIAST BP 80-89 MM HG: CPT | Performed by: FAMILY MEDICINE

## 2024-03-25 PROCEDURE — 99244 OFF/OP CNSLTJ NEW/EST MOD 40: CPT | Performed by: FAMILY MEDICINE

## 2024-03-25 PROCEDURE — 85025 COMPLETE CBC W/AUTO DIFF WBC: CPT | Performed by: FAMILY MEDICINE

## 2024-03-25 PROCEDURE — 80053 COMPREHEN METABOLIC PANEL: CPT | Performed by: FAMILY MEDICINE

## 2024-03-25 RX ORDER — LEVETIRACETAM 750 MG/1
750 TABLET ORAL 2 TIMES DAILY
Qty: 142 TABLET | Refills: 0 | Status: SHIPPED | OUTPATIENT
Start: 2024-03-25

## 2024-03-25 NOTE — PROGRESS NOTES
Estuardo Boyce is a 63 year old female who presents for a pre-operative physical exam. Patient is to have RESECTION OF EXOSTOSIS OF THE RIGHT FOOT, to be done by Dr. MADIE ACEVEDO at [facility] on 4/10/24.      HPI:   Pt complains of having pain and a bony mass on the top of the right foot. Has been getting more painful and difficulty with ambulation, xray showed exostosis, scheduled for surgical excision. Has a HX of seizures, also HX of pseudobulbar palsy, seizures and neurological state is stable has appt with neurology upcoming.    Current Outpatient Medications   Medication Sig Dispense Refill    levetiracetam 750 MG Oral Tab Take 1 tablet (750 mg total) by mouth 2 (two) times daily. 142 tablet 0    diphenhydrAMINE 25 MG Oral Cap Take 1 capsule (25 mg total) by mouth as needed.      aspirin 81 MG Oral Chew Tab Chew 1 tablet (81 mg total) by mouth daily.      Multiple Vitamin (MULTIVITAMIN OR) Take by mouth daily.        Allergies:   Allergies   Allergen Reactions    Atorvastatin OTHER (SEE COMMENTS)     Passes out and cause seizure    Lamotrigine OTHER (SEE COMMENTS) and RASH     Neck pain      Past Medical History:   Diagnosis Date    Migraines     Seizure disorder (HCC)     Traumatic brain injury (HCC)       Past Surgical History:   Procedure Laterality Date          COLONOSCOPY        Family History   Problem Relation Age of Onset    Stroke Father       Social History:   Social History     Socioeconomic History    Marital status:    Tobacco Use    Smoking status: Former    Smokeless tobacco: Never    Tobacco comments:     Quit 16 yrs    Vaping Use    Vaping Use: Never used   Substance and Sexual Activity    Alcohol use: Not Currently    Drug use: Not Currently    Sexual activity: Not Currently   Other Topics Concern    Caffeine Concern No    Exercise No    Seat Belt Yes         REVIEW OF SYSTEMS:   GENERAL: feels well otherwise  SKIN: denies any unusual skin lesions  EYES:denies  blurred vision or double vision  HEENT: denies nasal congestion, sinus pain or ST  LUNGS: denies shortness of breath with exertion  CARDIOVASCULAR: denies chest pain on exertion  GI: denies abdominal pain,denies heartburn  : denies dysuria, vaginal discharge or itching,periods regular denies nocturia or changes in stream  MUSCULOSKELETAL: denies back pain, right foot pain, has a bony mass over the top of the foot   NEURO: denies headaches  PSYCHE: denies depression or anxiety  HEMATOLOGIC: denies hx of anemia  ENDOCRINE: denies thyroid history  ALL/ASTHMA: denies hx of allergy or asthma    EXAM:   /80   Pulse 60   Temp 98.3 °F (36.8 °C)   Resp 18   Wt 110 lb 9.6 oz (50.2 kg)   SpO2 98%   BMI 19.59 kg/m²   GENERAL: well developed, well nourished,in no apparent distress  SKIN: no rashes,no suspicious lesions  HEENT: atraumatic, normocephalic,ears and throat are clear  EYES:PERRLA, EOMI, normal optic disk,conjunctiva are clear  NECK: supple,no adenopathy,no bruits  CHEST: no chest tenderness  LUNGS: clear to auscultation  CARDIO: RRR without murmur  GI: good BS's,no masses, HSM or tenderness  MUSCULOSKELETAL: back is not tender,FROM of the back  EXTREMITIES: no cyanosis, clubbing or edema, there is a firm palpable mass over the dorsum of the right foot, has AFO's on  NEURO: Oriented times three,cranial nerves are intact,motor  is 4/5, gait is unsteady  ASSESSMENT AND PLAN:     Encounter Diagnoses   Name Primary?    Preop examination     Exostosis of right foot     Complex partial epilepsy without status epilepticus (HCC) Yes    Pseudobulbar palsy (HCC)        Orders Placed This Encounter   Procedures    CBC W Differential W Platelet [E]    Comp Metabolic Panel (14) [E]       Imaging & Consults:  ELECTROCARDIOGRAM, COMPLETE  SINUS BRADYCARDIA  JUNCTIONAL ST DEPRESSION, BUT HAS CONSIDERABLE BACKGROUND STATIC DUE TO TREMOR  FROM REMOTE RESIDUAL BRAIN INJURY    HER CBC AND CMP WERE UNREMARKABLE  Estuardo  Mark Boyce is a 63 year old female who presents for a pre-operative physical exam. Patient is to have  Pt has no significant history of cardiac or pulmonary conditions. Pt is a good surgical candidate. This consult was sent back the referring physician, Dr. Hoffmann . Kulwant  MEDICALLY CLEAR FOR SURGERY

## 2024-03-25 NOTE — TELEPHONE ENCOUNTER
Medication: Levetiracetam 750mg     Date of last refill: 3/20/2023 (#180/3)  [place]      Last office visit: 3/20/2023  Due back to clinic per last office note:  1 year  Date next office visit scheduled:    Future Appointments   Date Time Provider Department Center   3/25/2024  5:20 PM Олег Haywood DO [facility] [place]   3/30/2024  2:20 PM PF DEXA RM1 PF DEXA [place]   4/15/2024  4:15 PM Edwin, Latosha, PT [facility] [place]   4/22/2024  4:15 PM Edwin, Latosha, PT [facility] [place]   4/29/2024  4:15 PM Edwin, Latosha, PT [facility] [place]   5/2/2024  4:15 PM Edwin, Latosha, PT [facility] [place]   5/6/2024  4:15 PM Edwin, Latosha, PT [facility] [place]   5/9/2024  4:15 PM Edwin, Latosha, PT [facility] [place]   5/13/2024  4:15 PM Edwin, Latosha, PT [facility] [place]   5/16/2024  4:15 PM Edwin, Latosha, PT [facility] [place]   5/20/2024  4:15 PM Edwin, Latosha, PT [facility] [place]   5/23/2024  4:15 PM Edwin, Latosha, PT [facility] [place]   5/30/2024  4:15 PM Edwin, Latosha, PT [facility] [place]   6/4/2024  3:00 PM Paul Alexis MD [facility] [address] [facility] [address]           Last OV note recommendation:    - Continue keppra 750 mg bid (pt and  confirmed that pt is taking 750 mg bid not 1500 mg bid , no dosage change made today  - Seizure precautions discussed.   -If you experience any seizures or concerning events, please feel free to reach out to me soon as possible.  -script for right foot orthotics given  -Follow-up in 12 months

## 2024-03-25 NOTE — TELEPHONE ENCOUNTER
Pt is having surgery on 4/15. She scheduled appt with Provider on 6/4. She is requesting refill for levetiracetam 750 MG Oral Tab, [facility] #[id number]. Please advise, Pt's best call back number is [phone]. Endorsed to RN for Provider.

## 2024-03-26 PROBLEM — Z87.820 HISTORY OF TRAUMATIC BRAIN INJURY: Status: ACTIVE | Noted: 2024-03-26

## 2024-03-26 LAB
ATRIAL RATE: 59 BPM
P AXIS: 90 DEGREES
P-R INTERVAL: 134 MS
Q-T INTERVAL: 400 MS
QRS DURATION: 60 MS
QTC CALCULATION (BEZET): 396 MS
R AXIS: -20 DEGREES
T AXIS: 45 DEGREES
VENTRICULAR RATE: 59 BPM

## 2024-03-28 ENCOUNTER — OFFICE VISIT (OUTPATIENT)
Dept: NEUROLOGY | Facility: CLINIC | Age: 64
End: 2024-03-28
Payer: COMMERCIAL

## 2024-03-28 VITALS
RESPIRATION RATE: 16 BRPM | SYSTOLIC BLOOD PRESSURE: 100 MMHG | HEART RATE: 56 BPM | BODY MASS INDEX: 20 KG/M2 | WEIGHT: 113 LBS | DIASTOLIC BLOOD PRESSURE: 64 MMHG

## 2024-03-28 DIAGNOSIS — G40.209: Primary | ICD-10-CM

## 2024-03-28 DIAGNOSIS — Z87.820 HISTORY OF TRAUMATIC BRAIN INJURY: ICD-10-CM

## 2024-03-28 PROCEDURE — 3074F SYST BP LT 130 MM HG: CPT | Performed by: OTHER

## 2024-03-28 PROCEDURE — 99214 OFFICE O/P EST MOD 30 MIN: CPT | Performed by: OTHER

## 2024-03-28 PROCEDURE — 3078F DIAST BP <80 MM HG: CPT | Performed by: OTHER

## 2024-03-28 RX ORDER — LEVETIRACETAM 750 MG/1
750 TABLET ORAL 2 TIMES DAILY
Qty: 180 TABLET | Refills: 3 | Status: SHIPPED | OUTPATIENT
Start: 2024-03-28

## 2024-03-28 NOTE — PATIENT INSTRUCTIONS
Refill policies:    Allow 2-3 business days for refills; controlled substances may take longer.  Contact your pharmacy at least 5 days prior to running out of medication and have them send an electronic request or submit request through the “request refill” option in your [employer] account.  Refills are not addressed on weekends; covering physicians do not authorize routine medications on weekends.  No narcotics or controlled substances are refilled after noon on Fridays or by on call physicians.  By law, narcotics must be electronically prescribed.  A 30 day supply with no refills is the maximum allowed.  If your prescription is due for a refill, you may be due for a follow up appointment.  To best provide you care, patients receiving routine medications need to be seen at least once a year.  Patients receiving narcotic/controlled substance medications need to be seen at least once every 3 months.  In the event that your preferred pharmacy does not have the requested medication in stock (e.g. Backordered), it is your responsibility to find another pharmacy that has the requested medication available.  We will gladly send a new prescription to that pharmacy at your request.    Scheduling Tests:    If your physician has ordered radiology tests such as MRI or CT scans, please contact Central Scheduling at [phone] right away to schedule the test.  Once scheduled, the Count includes the [facility] Centralized Referral Team will work with your insurance carrier to obtain pre-certification or prior authorization.  Depending on your insurance carrier, approval may take 3-10 days.  It is highly recommended patients assure they have received an authorization before having a test performed.  If test is done without insurance authorization, patient may be responsible for the entire amount billed.      Precertification and Prior Authorizations:  If your physician has recommended that you have a procedure or additional testing performed the Count includes the [facility]  Centralized Referral Team will contact your insurance carrier to obtain pre-certification or prior authorization.    You are strongly encouraged to contact your insurance carrier to verify that your procedure/test has been approved and is a COVERED benefit.  Although the [facility] Centralized Referral Team does its due diligence, the insurance carrier gives the disclaimer that \"Although the procedure is authorized, this does not guarantee payment.\"    Ultimately the patient is responsible for payment.   Thank you for your understanding in this matter.  Paperwork Completion:  If you require FMLA or disability paperwork for your recovery, please make sure to either drop it off or have it faxed to our office at [phone]. Be sure the form has your name and date of birth on it.  The form will be faxed to our Forms Department and they will complete it for you.  There is a 25$ fee for all forms that need to be filled out.  Please be aware there is a 10-14 day turnaround time.  You will need to sign a release of information (CHARLA) form if your paperwork does not come with one.  You may call the Forms Department with any questions at [phone].  Their fax number is [phone].

## 2024-03-28 NOTE — PROGRESS NOTES
[facility] Neurology Outpatient Progress Note  Date of service: 3/28/2024    Assessment:     ICD-10-CM    1. 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 History of traumatic brain injury  Z87.820    With residual neuro deficit     Seizure free, stable     Plan:  Continue keppra 750 mg bid , no dosage change made today  PCP to follow  Follow-up in 12 months  Pt should go ER for any new or worsening symptoms and contact office     Subjective:   History:  Patient here for a follow-up visit for seizure. Since last visit no seizure reported. Tolerating medications without side effects.  Estuardo Boyce is a 63 year old female who presents for Seizures   Former pt of Dr Santoyo   No seizures or episodes of loss of consciousness since previous visit.  Patient reports she is tolerating the Keppra and denies side effects.  pt and  confirmed that she is taking 750 mg twice a day currently.    History/Other:   REVIEW OF SYSTEMS:  A 10-point system was reviewed. Pertinent positives and negatives are noted as above       Current Outpatient Medications:     levetiracetam 750 MG Oral Tab, Take 1 tablet (750 mg total) by mouth 2 (two) times daily., Disp: 180 tablet, Rfl: 3    diphenhydrAMINE 25 MG Oral Cap, Take 1 capsule (25 mg total) by mouth as needed., Disp: , Rfl:     aspirin 81 MG Oral Chew Tab, Chew 1 tablet (81 mg total) by mouth daily., Disp: , Rfl:     Multiple Vitamin (MULTIVITAMIN OR), Take by mouth daily., Disp: , Rfl:   Allergies:  Allergies   Allergen Reactions    Atorvastatin OTHER (SEE COMMENTS)     Passes out and cause seizure    Lamotrigine OTHER (SEE COMMENTS) and RASH     Neck pain     Past Medical History:   Diagnosis Date    Migraines     Seizure disorder (HCC)     Traumatic brain injury (HCC)      Past Surgical History:   Procedure Laterality Date          COLONOSCOPY       Social History:  Social History     Tobacco Use    Smoking status: Former    Smokeless  tobacco: Never    Tobacco comments:     Quit 16 yrs    Substance Use Topics    Alcohol use: Not Currently     Family History   Problem Relation Age of Onset    Stroke Father      Objective:   Neurological Examination:  /64   Pulse 56   Resp 16   Wt 113 lb (51.3 kg)   BMI 20.02 kg/m²   Mental status: A & O X 3  Language: no aphasia  Speech: no dysarthria  CN II-XII:asymetric face   Motor strength: right hemiplegic  DTRs: brisk reflex in right UE and LE, left 5/5  Coordination: FNF fair  Sensory: symmetric  Gait: right leg dragging    Test reviewed on 3/28/2024      Paul Alexis MD (Michael)  Neurology  [facility]  3/28/2024, 1:31 PM  CC: Олег Haywood DO

## 2024-03-29 ENCOUNTER — TELEPHONE (OUTPATIENT)
Dept: FAMILY MEDICINE CLINIC | Facility: CLINIC | Age: 64
End: 2024-03-29

## 2024-03-29 NOTE — TELEPHONE ENCOUNTER
Patient's name and  verified     2024, patient saw Dr Haywood- BP was 128/80    Yesterday, patient saw neurologist- /64.   According to patient he stated her BP was alarming.    Neurologist talked about fish oil tablets for Stroke or Blood pressure, patient was unsure of the reason why.     BP Readings from Last 3 Encounters:   24 100/64   24 126/80   24 112/86      Patient couldn't find her blood pressure machine at this time. This RN was going to have patient take current BP. If patient finds the BP machine will take and let us know    Please Advise

## 2024-03-29 NOTE — TELEPHONE ENCOUNTER
Олег Haywood, Quiana Waller RN  Her LDL could be a little better , but nothing life threatening?          Left message to call back

## 2024-03-29 NOTE — TELEPHONE ENCOUNTER
PATIENT CALLING THIS MORNING ASKING IF DR ROSA IS HERE. PATIENT WANTS TO ASK DR ROSA SOMETHING. SHE ASKED IF A NURSE CAN POSSIBLY GET HER QUESTION REACHED TO DR ROSA. I INFORMED PATIENT THAT DR ROSA NOT IN TODAY, AND I DID TRY AND OFFER HER AN APPOINTMENT WITH [facility] IF NEEDED, BUT SHE JUST HAS A QUESTION.

## 2024-03-30 ENCOUNTER — HOSPITAL ENCOUNTER (OUTPATIENT)
Dept: BONE DENSITY | Age: 64
Discharge: HOME | End: 2024-03-30
Attending: FAMILY MEDICINE
Payer: MEDICARE

## 2024-03-30 DIAGNOSIS — M81.0 POST-MENOPAUSAL OSTEOPOROSIS: ICD-10-CM

## 2024-03-30 DIAGNOSIS — Z13.820 OSTEOPOROSIS SCREENING: ICD-10-CM

## 2024-03-30 PROCEDURE — 77080 DXA BONE DENSITY AXIAL: CPT | Performed by: FAMILY MEDICINE

## 2024-03-30 NOTE — TELEPHONE ENCOUNTER
Advised patient of Dr. Haywood's note below. Patient verbalized understanding. No further questions at this time.

## 2024-04-02 ENCOUNTER — TELEPHONE (OUTPATIENT)
Dept: FAMILY MEDICINE CLINIC | Facility: CLINIC | Age: 64
End: 2024-04-02

## 2024-04-02 DIAGNOSIS — E55.9 VITAMIN D DEFICIENCY: Primary | ICD-10-CM

## 2024-04-02 NOTE — TELEPHONE ENCOUNTER
----- Message from Олег Haywood, DO sent at 4/2/2024  8:05 AM CDT -----  Please notify Estuardo that she has osteoporosis which is not surprising, if she is not taking Vit D.she should be. Maybe 3000, international international s and check Vit D in 3-4 months

## 2024-04-05 ENCOUNTER — TELEPHONE (OUTPATIENT)
Dept: FAMILY MEDICINE CLINIC | Facility: CLINIC | Age: 64
End: 2024-04-05

## 2024-04-05 ENCOUNTER — TELEPHONE (OUTPATIENT)
Dept: PHYSICAL THERAPY | Facility: HOSPITAL | Age: 64
End: 2024-04-05

## 2024-04-05 NOTE — TELEPHONE ENCOUNTER
Spoke with patient and updated her what we have listed on her chart for allergies, information sent to her [facility] as well. Pt reqeusted we call her Foot surgeons office to update them. Call made.

## 2024-04-05 NOTE — TELEPHONE ENCOUNTER
Pt needs to know what med's she is allergic to. She is having foot surgery and Dr Fischer needs to know.

## 2024-04-09 ENCOUNTER — TELEPHONE (OUTPATIENT)
Dept: FAMILY MEDICINE CLINIC | Facility: CLINIC | Age: 64
End: 2024-04-09

## 2024-04-09 NOTE — TELEPHONE ENCOUNTER
PATIENT CALLING THIS MORNING. SHE SAYS SHE RECEIVED A MESSAGE THAT SHE IS DUE FOR LABS AND MAMMOGRAM. PATIENT SAID SHE HAD THE MAMMOGRAM DONE ALREADY.  NOT CLEAR ON WHERE IT WAS DONE

## 2024-04-09 NOTE — TELEPHONE ENCOUNTER
Letter mailed to patient reminding them they have outstanding orders.  Lab Frequency Next Occurrence   3D Mammogram Digital Screen, Bilateral (CPT=77067/90322) Once 02/21/2024

## 2024-04-15 ENCOUNTER — APPOINTMENT (OUTPATIENT)
Dept: PHYSICAL THERAPY | Age: 64
End: 2024-04-15
Attending: FAMILY MEDICINE
Payer: COMMERCIAL

## 2024-04-22 ENCOUNTER — TELEPHONE (OUTPATIENT)
Dept: PHYSICAL THERAPY | Facility: HOSPITAL | Age: 64
End: 2024-04-22

## 2024-04-22 ENCOUNTER — APPOINTMENT (OUTPATIENT)
Dept: PHYSICAL THERAPY | Age: 64
End: 2024-04-22
Attending: FAMILY MEDICINE
Payer: COMMERCIAL

## 2024-04-24 ENCOUNTER — TELEPHONE (OUTPATIENT)
Dept: PHYSICAL THERAPY | Facility: HOSPITAL | Age: 64
End: 2024-04-24

## 2024-04-29 ENCOUNTER — APPOINTMENT (OUTPATIENT)
Dept: PHYSICAL THERAPY | Age: 64
End: 2024-04-29
Attending: FAMILY MEDICINE
Payer: COMMERCIAL

## 2024-05-02 ENCOUNTER — APPOINTMENT (OUTPATIENT)
Dept: PHYSICAL THERAPY | Age: 64
End: 2024-05-02
Attending: FAMILY MEDICINE
Payer: COMMERCIAL

## 2024-05-06 ENCOUNTER — APPOINTMENT (OUTPATIENT)
Dept: PHYSICAL THERAPY | Age: 64
End: 2024-05-06
Attending: FAMILY MEDICINE
Payer: COMMERCIAL

## 2024-05-09 ENCOUNTER — APPOINTMENT (OUTPATIENT)
Dept: PHYSICAL THERAPY | Age: 64
End: 2024-05-09
Attending: FAMILY MEDICINE
Payer: COMMERCIAL

## 2024-05-13 ENCOUNTER — APPOINTMENT (OUTPATIENT)
Dept: PHYSICAL THERAPY | Age: 64
End: 2024-05-13
Attending: FAMILY MEDICINE
Payer: COMMERCIAL

## 2024-05-16 ENCOUNTER — APPOINTMENT (OUTPATIENT)
Dept: PHYSICAL THERAPY | Age: 64
End: 2024-05-16
Attending: FAMILY MEDICINE
Payer: COMMERCIAL

## 2024-05-17 ENCOUNTER — TELEPHONE (OUTPATIENT)
Dept: FAMILY MEDICINE CLINIC | Facility: CLINIC | Age: 64
End: 2024-05-17

## 2024-05-17 NOTE — TELEPHONE ENCOUNTER
Patient has mammogram scheduled 5/28    She is wanting to know if  would do her pap/pelvic    I explained that her medicare annual wellness was done for the year and medicare doesn't cover physicals    She just wants the pap/pelvic    She has been seeing gyn but does not want to travel back there for yearly exam    Please adv  Thank you

## 2024-05-20 ENCOUNTER — APPOINTMENT (OUTPATIENT)
Dept: PHYSICAL THERAPY | Age: 64
End: 2024-05-20
Attending: FAMILY MEDICINE
Payer: COMMERCIAL

## 2024-05-23 ENCOUNTER — APPOINTMENT (OUTPATIENT)
Dept: PHYSICAL THERAPY | Age: 64
End: 2024-05-23
Attending: FAMILY MEDICINE
Payer: COMMERCIAL

## 2024-05-30 ENCOUNTER — APPOINTMENT (OUTPATIENT)
Dept: PHYSICAL THERAPY | Age: 64
End: 2024-05-30
Attending: FAMILY MEDICINE
Payer: COMMERCIAL

## 2024-07-25 DIAGNOSIS — R92.8 ABNORMALITY OF LEFT BREAST ON SCREENING MAMMOGRAM: Primary | ICD-10-CM

## 2024-07-25 DIAGNOSIS — R92.8 ABNORMALITY OF BOTH BREASTS ON SCREENING MAMMOGRAM: Primary | ICD-10-CM

## 2024-07-29 ENCOUNTER — OFFICE VISIT (OUTPATIENT)
Dept: FAMILY MEDICINE CLINIC | Facility: CLINIC | Age: 64
End: 2024-07-29
Payer: COMMERCIAL

## 2024-07-29 VITALS
SYSTOLIC BLOOD PRESSURE: 130 MMHG | BODY MASS INDEX: 19 KG/M2 | OXYGEN SATURATION: 96 % | RESPIRATION RATE: 16 BRPM | WEIGHT: 108.5 LBS | HEART RATE: 71 BPM | TEMPERATURE: 98 F | DIASTOLIC BLOOD PRESSURE: 72 MMHG

## 2024-07-29 DIAGNOSIS — Z11.51 ENCOUNTER FOR SCREENING FOR HUMAN PAPILLOMAVIRUS (HPV): Primary | ICD-10-CM

## 2024-07-29 PROCEDURE — 88175 CYTOPATH C/V AUTO FLUID REDO: CPT | Performed by: FAMILY MEDICINE

## 2024-07-29 PROCEDURE — 87624 HPV HI-RISK TYP POOLED RSLT: CPT | Performed by: FAMILY MEDICINE

## 2024-07-29 NOTE — PROGRESS NOTES
Estuardo Boyce is a 64 year old female.  HPI:   Sonam is here for her PAP, has not had one in a very long time, never had an abnormal pap, had some PMB last year and had a thickened endometrium and a polyp that was removed, since then she has not had any further bleeding, was due for a PAP, had no other gyne concerns.   Current Outpatient Medications   Medication Sig Dispense Refill    levetiracetam 750 MG Oral Tab Take 1 tablet (750 mg total) by mouth 2 (two) times daily. 180 tablet 3    diphenhydrAMINE 25 MG Oral Cap Take 1 capsule (25 mg total) by mouth as needed.      aspirin 81 MG Oral Chew Tab Chew 1 tablet (81 mg total) by mouth daily.      Multiple Vitamin (MULTIVITAMIN OR) Take by mouth daily.        Past Medical History:    Migraines    Seizure disorder (HCC)    Traumatic brain injury (HCC)      Social History:  Social History     Socioeconomic History    Marital status:    Tobacco Use    Smoking status: Former    Smokeless tobacco: Never    Tobacco comments:     Quit 16 yrs    Vaping Use    Vaping status: Never Used   Substance and Sexual Activity    Alcohol use: Not Currently    Drug use: Not Currently    Sexual activity: Not Currently   Other Topics Concern    Caffeine Concern Yes     Comment: tea and coffee    Exercise Yes    Seat Belt Yes     Social Determinants of Health      Received from [facility], [facility]    Social Connections    Received from [facility], [facility]    Housing Stability        REVIEW OF SYSTEMS:   GENERAL HEALTH: feels well otherwise  SKIN: denies any unusual skin lesions or rashes  RESPIRATORY: denies shortness of breath with exertion  CARDIOVASCULAR: denies chest pain on exertion  GI: denies abdominal pain and denies heartburn  NEURO: denies headaches  GYNE: HX of PM  EXAM:   /72   Pulse 71   Temp 97.6 °F (36.4 °C) (Temporal)   Resp 16   Wt 108 lb 8 oz (49.2 kg)    SpO2 96%   BMI 19.22 kg/m²   GENERAL: well developed, well nourished,in no apparent distress  SKIN: no rashes,no suspicious lesions  EXTREMITIES: no cyanosis, clubbing or edema  external genitalia were unremarkable, the introitus was normal Cervix was nulliparous, PAP sample was obtained without incident, bimanaul exam, showed a normal sized uterus and no adenxal masses no CMT    ASSESSMENT AND PLAN:     Encounter Diagnosis   Name Primary?    Encounter for screening for human papillomavirus (HPV) Yes       Orders Placed This Encounter   Procedures    ThinPrep PAP with HPV Reflex Request       Meds & Refills for this Visit:  Requested Prescriptions      No prescriptions requested or ordered in this encounter       Imaging & Consults:  None     The patient indicates understanding of these issues and agrees to the plan.will contact when results available

## 2024-07-30 ENCOUNTER — TELEPHONE (OUTPATIENT)
Dept: FAMILY MEDICINE CLINIC | Facility: CLINIC | Age: 64
End: 2024-07-30

## 2024-07-30 DIAGNOSIS — E83.52 HYPERCALCEMIA DUE TO A DRUG: Primary | ICD-10-CM

## 2024-07-30 DIAGNOSIS — T50.905A HYPERCALCEMIA DUE TO A DRUG: Primary | ICD-10-CM

## 2024-07-30 NOTE — TELEPHONE ENCOUNTER
Patient is calling- she is scheduled for labs this weekend.    She states she had a DEXA scan done and she believes Dr. Haywood told her to take 4000mg of Calcium daily.    Patient states she has been taking Calcium 1000mg X4 tabs daily.    She takes a MVI that also has a 1000mg of calcium    When asked if she is taking any Vitamin D- patient states she is taking the calcium that she was told. Patient was under the impression that calcium and Vitamin D are the same?    Routing to Dr. Haywood- what additional labs would you like to have drawn this weekend when she is in for nurse visit?    And please advise if she should continue the calcium.

## 2024-07-30 NOTE — TELEPHONE ENCOUNTER
Patient was advised- verbalized understanding    Will come in for labs this weekend    Future Appointments   Date Time Provider Department Center   8/3/2024  8:30 AM ROZ [facility] NURSE [place]   8/30/2024  9:40 AM PF STEVEN RM1 PF MAMMO [place]

## 2024-07-30 NOTE — TELEPHONE ENCOUNTER
Patient is scheduled on Saturday to have Vitamin D checked- does she need to have calcium checked as well?    She has been taking 5000mg of calcium daily since April     Do you want her to start the vitamin D prior to having labs done on Saturday or wait?

## 2024-08-01 LAB
.: NORMAL
.: NORMAL

## 2024-08-02 ENCOUNTER — DOCUMENTATION ONLY (OUTPATIENT)
Dept: FAMILY MEDICINE CLINIC | Facility: CLINIC | Age: 64
End: 2024-08-02

## 2024-08-02 LAB — HPV E6+E7 MRNA CVX QL NAA+PROBE: NEGATIVE

## 2024-08-03 ENCOUNTER — NURSE ONLY (OUTPATIENT)
Dept: FAMILY MEDICINE CLINIC | Facility: CLINIC | Age: 64
End: 2024-08-03
Payer: COMMERCIAL

## 2024-08-03 DIAGNOSIS — E83.52 HYPERCALCEMIA DUE TO A DRUG: ICD-10-CM

## 2024-08-03 DIAGNOSIS — T50.905A HYPERCALCEMIA DUE TO A DRUG: ICD-10-CM

## 2024-08-03 DIAGNOSIS — E55.9 VITAMIN D DEFICIENCY: ICD-10-CM

## 2024-08-03 LAB
ALBUMIN SERPL-MCNC: 4.5 G/DL (ref 3.2–4.8)
ALBUMIN/GLOB SERPL: 1.5 {RATIO} (ref 1–2)
ALP LIVER SERPL-CCNC: 77 U/L
ALT SERPL-CCNC: 18 U/L
ANION GAP SERPL CALC-SCNC: 3 MMOL/L (ref 0–18)
AST SERPL-CCNC: 19 U/L (ref ?–34)
BILIRUB SERPL-MCNC: 0.5 MG/DL (ref 0.2–1.1)
BUN BLD-MCNC: 16 MG/DL (ref 9–23)
CALCIUM BLD-MCNC: 10 MG/DL (ref 8.7–10.4)
CHLORIDE SERPL-SCNC: 106 MMOL/L (ref 98–112)
CO2 SERPL-SCNC: 29 MMOL/L (ref 21–32)
CREAT BLD-MCNC: 0.68 MG/DL
EGFRCR SERPLBLD CKD-EPI 2021: 96 ML/MIN/1.73M2 (ref 60–?)
FASTING STATUS PATIENT QL REPORTED: YES
GLOBULIN PLAS-MCNC: 3 G/DL (ref 2–3.5)
GLUCOSE BLD-MCNC: 95 MG/DL (ref 70–99)
OSMOLALITY SERPL CALC.SUM OF ELEC: 287 MOSM/KG (ref 275–295)
POTASSIUM SERPL-SCNC: 4.9 MMOL/L (ref 3.5–5.1)
PROT SERPL-MCNC: 7.5 G/DL (ref 5.7–8.2)
SODIUM SERPL-SCNC: 138 MMOL/L (ref 136–145)
VIT D+METAB SERPL-MCNC: 44.6 NG/ML (ref 30–100)

## 2024-08-03 PROCEDURE — 82306 VITAMIN D 25 HYDROXY: CPT | Performed by: FAMILY MEDICINE

## 2024-08-03 PROCEDURE — 80053 COMPREHEN METABOLIC PANEL: CPT | Performed by: FAMILY MEDICINE

## 2024-08-03 NOTE — PROGRESS NOTES
Estuardo Boyce present in office for nurse visit.  Labs as ordered by Dr. Haywood; 21 g and Right AC     All questions/concerns addressed. Patient left in stable condition.

## 2024-08-05 ENCOUNTER — TELEPHONE (OUTPATIENT)
Dept: FAMILY MEDICINE CLINIC | Facility: CLINIC | Age: 64
End: 2024-08-05

## 2024-08-05 NOTE — TELEPHONE ENCOUNTER
Patient was advised of results- verbalized understanding    Patient has not been taking Vitamin D- she does take a MVI    Per verbal conversation with Dr. Haywood, patient should take Vitamin D3  3000IU daily. Can continue with MVI    Patient verbalized understanding

## 2024-08-05 NOTE — TELEPHONE ENCOUNTER
Her, BLOOD SUGAR, kidney , liver function tests and PAP all looked good, PAP was normal, Vit D was normal but could stand to be a little better. How Much Vit D is she taking?

## 2024-08-30 ENCOUNTER — HOSPITAL ENCOUNTER (OUTPATIENT)
Dept: MAMMOGRAPHY | Age: 64
Discharge: HOME | End: 2024-08-30
Attending: FAMILY MEDICINE
Payer: MEDICARE

## 2024-08-30 ENCOUNTER — HOSPITAL ENCOUNTER (OUTPATIENT)
Dept: ULTRASOUND IMAGING | Age: 64
Discharge: HOME | End: 2024-08-30
Attending: FAMILY MEDICINE
Payer: MEDICARE

## 2024-08-30 DIAGNOSIS — R92.8 ABNORMALITY OF BOTH BREASTS ON SCREENING MAMMOGRAM: ICD-10-CM

## 2024-08-30 DIAGNOSIS — N63.20 MASS OF LEFT BREAST, UNSPECIFIED QUADRANT: Primary | ICD-10-CM

## 2024-08-30 PROCEDURE — 76642 ULTRASOUND BREAST LIMITED: CPT | Performed by: FAMILY MEDICINE

## 2024-08-30 PROCEDURE — 77062 BREAST TOMOSYNTHESIS BI: CPT | Performed by: FAMILY MEDICINE

## 2024-08-30 PROCEDURE — 77066 DX MAMMO INCL CAD BI: CPT | Performed by: FAMILY MEDICINE

## 2024-09-10 ENCOUNTER — TELEPHONE (OUTPATIENT)
Dept: FAMILY MEDICINE CLINIC | Facility: CLINIC | Age: 64
End: 2024-09-10

## 2024-09-10 NOTE — TELEPHONE ENCOUNTER
PATIENT CALLING. SHE SAYS DR ROSA HAD PRESCRIBED VITAMIN D.  PATIENT NOTICED A VARICOSE VEIN ON HER LEG.  PATIENT WONDERING IF THE VITAMIN D DID THIS.

## 2024-09-11 NOTE — TELEPHONE ENCOUNTER
Patient was advised of the information below- advised that she should reach out if vericose veins become problematic and we need to address with vascular.    Patient verbalized understanding

## 2024-10-04 ENCOUNTER — TELEPHONE (OUTPATIENT)
Dept: FAMILY MEDICINE CLINIC | Facility: CLINIC | Age: 64
End: 2024-10-04

## 2024-10-04 NOTE — TELEPHONE ENCOUNTER
Letter mailed to patient reminding them they have outstanding orders.  Lab Frequency Next Occurrence   STEVEN WAN 2D+3D DIAGNOSTIC ADDL VWS BILAT (CBD=29007/[id number]) Once 07/25/2024

## 2024-11-30 ENCOUNTER — IMMUNIZATION (OUTPATIENT)
Dept: FAMILY MEDICINE CLINIC | Facility: CLINIC | Age: 64
End: 2024-11-30
Payer: COMMERCIAL

## 2024-11-30 DIAGNOSIS — Z23 NEED FOR VACCINATION: Primary | ICD-10-CM

## 2024-11-30 PROCEDURE — 90662 IIV NO PRSV INCREASED AG IM: CPT | Performed by: FAMILY MEDICINE

## 2024-11-30 PROCEDURE — 90471 IMMUNIZATION ADMIN: CPT | Performed by: FAMILY MEDICINE

## 2024-12-30 ENCOUNTER — OFFICE VISIT (OUTPATIENT)
Dept: FAMILY MEDICINE CLINIC | Facility: CLINIC | Age: 64
End: 2024-12-30
Payer: COMMERCIAL

## 2024-12-30 VITALS
TEMPERATURE: 98 F | DIASTOLIC BLOOD PRESSURE: 80 MMHG | HEART RATE: 63 BPM | BODY MASS INDEX: 20 KG/M2 | SYSTOLIC BLOOD PRESSURE: 124 MMHG | OXYGEN SATURATION: 99 % | WEIGHT: 110.19 LBS | RESPIRATION RATE: 116 BRPM

## 2024-12-30 DIAGNOSIS — E55.9 VITAMIN D DEFICIENCY: Primary | ICD-10-CM

## 2024-12-30 DIAGNOSIS — L91.8 SKIN TAG, ACQUIRED: ICD-10-CM

## 2024-12-30 PROCEDURE — 3074F SYST BP LT 130 MM HG: CPT | Performed by: FAMILY MEDICINE

## 2024-12-30 PROCEDURE — 99213 OFFICE O/P EST LOW 20 MIN: CPT | Performed by: FAMILY MEDICINE

## 2024-12-30 PROCEDURE — 3079F DIAST BP 80-89 MM HG: CPT | Performed by: FAMILY MEDICINE

## 2024-12-30 RX ORDER — GLUCOSAMINE HCL 500 MG
TABLET ORAL
COMMUNITY

## 2024-12-30 NOTE — PROGRESS NOTES
Estuardo Boyce is a 64 year old female.  HPI:   Estuardo is here for possible skin tag removal and discussion of Vit D, she had a level drawn back in August and it was low. So we advised her to increase her Vit D to 3000 international units daily.  As far as the skin tags, there are several spots on her neck and back that get irritated, wondering if they can be removed.     Current Outpatient Medications   Medication Sig Dispense Refill    Cholecalciferol (VITAMIN D3) 75 MCG (3000 UT) Oral Tab Take by mouth.      levetiracetam 750 MG Oral Tab Take 1 tablet (750 mg total) by mouth 2 (two) times daily. 180 tablet 3    diphenhydrAMINE 25 MG Oral Cap Take 1 capsule (25 mg total) by mouth as needed.      aspirin 81 MG Oral Chew Tab Chew 1 tablet (81 mg total) by mouth daily.      Multiple Vitamin (MULTIVITAMIN OR) Take by mouth daily.        Past Medical History:    Migraines    Seizure disorder (HCC)    Traumatic brain injury (HCC)      Social History:  Social History     Socioeconomic History    Marital status:    Tobacco Use    Smoking status: Former     Passive exposure: Never    Smokeless tobacco: Never    Tobacco comments:     Quit 16 yrs    Vaping Use    Vaping status: Never Used   Substance and Sexual Activity    Alcohol use: Not Currently    Drug use: Not Currently    Sexual activity: Not Currently   Other Topics Concern    Caffeine Concern Yes     Comment: tea and coffee    Exercise Yes    Seat Belt Yes     Social Drivers of Health      [facility]    Social Connections    [facility] from [facility], [facility]    Housing Stability        REVIEW OF SYSTEMS:   GENERAL HEALTH: feels well otherwise  SKIN: numerous skin lesions on back and shoulders  RESPIRATORY: denies shortness of breath with exertion  CARDIOVASCULAR: denies chest pain on exertion  GI: denies abdominal pain and denies  heartburn  NEURO: denies headaches    EXAM:   /80   Pulse 63   Temp 97.9 °F (36.6 °C) (Temporal)   Resp (!) 116   Wt 110 lb 3.2 oz (50 kg)   SpO2 99%   BMI 19.52 kg/m²   GENERAL: well developed, well nourished,in no apparent distress  SKIN:  has a number of whiteheads , and skin tags that are very small and minimally elevated  HEENT: atraumatic, normocephalic,ears and throat are clear  NECK: supple,no adenopathy,no bruits  LUNGS: clear to auscultation  CARDIO: RRR without murmur  GI: good BS's,no masses, HSM or tenderness  EXTREMITIES: no cyanosis, clubbing or edema    ASSESSMENT AND PLAN:     Encounter Diagnoses   Name Primary?    Vitamin D deficiency Yes    Skin tag, acquired        Orders Placed This Encounter   Procedures    Vitamin D [E]       Meds & Refills for this Visit:  Requested Prescriptions      No prescriptions requested or ordered in this encounter       Imaging & Consults:  None     The patient indicates understanding of these issues and agrees to the plan.  The patient is asked to return in when the tags are more mature also get a Vit d level drawn.

## 2024-12-31 ENCOUNTER — LABORATORY ENCOUNTER (OUTPATIENT)
Dept: LAB | Age: 64
End: 2024-12-31
Attending: FAMILY MEDICINE
Payer: COMMERCIAL

## 2024-12-31 DIAGNOSIS — E55.9 VITAMIN D DEFICIENCY: ICD-10-CM

## 2024-12-31 LAB — VIT D+METAB SERPL-MCNC: 68 NG/ML (ref 30–100)

## 2024-12-31 PROCEDURE — 36415 COLL VENOUS BLD VENIPUNCTURE: CPT

## 2024-12-31 PROCEDURE — 82306 VITAMIN D 25 HYDROXY: CPT

## 2025-01-01 DIAGNOSIS — E55.9 VITAMIN D DEFICIENCY: Primary | ICD-10-CM

## 2025-02-03 DIAGNOSIS — G40.209: Primary | ICD-10-CM

## 2025-02-03 RX ORDER — LEVETIRACETAM 750 MG/1
750 TABLET ORAL 2 TIMES DAILY
Qty: 180 TABLET | Refills: 0 | Status: SHIPPED | OUTPATIENT
Start: 2025-02-03

## 2025-02-03 NOTE — TELEPHONE ENCOUNTER
Patient needs a refill levetiracetam 750 MG Oral Tab and send over to         [id number] - [place], [place] - [address] AT [facility] [address] [facility] [id number] [phone], [phone]   [address] [facility] [address]   Phone: [phone] Fax: [phone]   Hours: Not open 24 hours

## 2025-02-03 NOTE — TELEPHONE ENCOUNTER
Medication: Levetiracetam     Date of last refill: 03/28/2024 (#180/3)  [place]      Last office visit: 03/28/2024  Due back to clinic per last office note:  1 year  Date next office visit scheduled:    Future Appointments   Date Time Provider Department Center   3/25/2025  9:00 AM PF US 2 PF [facility]   3/25/2025 11:20 AM Paul Alexis MD [facility] [address]           Last OV note recommendation:    Continue keppra 750 mg bid , no dosage change made today  PCP to follow  Follow-up in 12 months  Pt should go ER for any new or worsening symptoms and contact office

## 2025-02-24 ENCOUNTER — TELEPHONE (OUTPATIENT)
Dept: NEUROLOGY | Facility: CLINIC | Age: 65
End: 2025-02-24

## 2025-02-24 NOTE — TELEPHONE ENCOUNTER
Spoke with pharmacy [phone]   Patient picked up #60 on 2/6/25   Refill request denied refill to soon  [email or web address] message sent         Medication: levetiracetam 750 MG Oral Tab      Date of last refill: 02/03/2025 (#180/0)  [place] 2/6/25     Last office visit: 3/24  Due back to clinic per last office note:  one year  Date next office visit scheduled:    Future Appointments   Date Time Provider Department Center   3/25/2025  9:00 AM PF [address]   5/1/2025  3:00 PM Paul Alexis MD [facility] [address] [facility] [address]           Last OV note recommendation:    Plan:  Continue keppra 750 mg bid , no dosage change made today  PCP to follow  Follow-up in 12 months  Pt should go ER for any new or worsening symptoms and contact office

## 2025-03-05 ENCOUNTER — TELEPHONE (OUTPATIENT)
Dept: NEUROLOGY | Facility: CLINIC | Age: 65
End: 2025-03-05

## 2025-03-05 NOTE — TELEPHONE ENCOUNTER
3/5 Left voicemail stating Provider will be out of office, please call back to reschedule appointment. Please assist with rescheduling when Pt returns call.

## 2025-05-09 ENCOUNTER — HOSPITAL ENCOUNTER (OUTPATIENT)
Dept: ULTRASOUND IMAGING | Age: 65
Discharge: HOME | End: 2025-05-09
Attending: FAMILY MEDICINE
Payer: COMMERCIAL

## 2025-05-09 DIAGNOSIS — N63.20 MASS OF LEFT BREAST, UNSPECIFIED QUADRANT: ICD-10-CM

## 2025-05-09 PROCEDURE — 76642 ULTRASOUND BREAST LIMITED: CPT | Performed by: FAMILY MEDICINE

## 2025-05-22 ENCOUNTER — OFFICE VISIT (OUTPATIENT)
Dept: NEUROLOGY | Facility: CLINIC | Age: 65
End: 2025-05-22
Payer: COMMERCIAL

## 2025-05-22 VITALS
RESPIRATION RATE: 16 BRPM | DIASTOLIC BLOOD PRESSURE: 58 MMHG | SYSTOLIC BLOOD PRESSURE: 98 MMHG | WEIGHT: 110 LBS | BODY MASS INDEX: 19 KG/M2 | HEART RATE: 64 BPM

## 2025-05-22 DIAGNOSIS — G40.209: ICD-10-CM

## 2025-05-22 DIAGNOSIS — Z87.820 HISTORY OF TRAUMATIC BRAIN INJURY: Primary | ICD-10-CM

## 2025-05-22 PROCEDURE — G2211 COMPLEX E/M VISIT ADD ON: HCPCS | Performed by: OTHER

## 2025-05-22 PROCEDURE — 3078F DIAST BP <80 MM HG: CPT | Performed by: OTHER

## 2025-05-22 PROCEDURE — 3074F SYST BP LT 130 MM HG: CPT | Performed by: OTHER

## 2025-05-22 PROCEDURE — 99214 OFFICE O/P EST MOD 30 MIN: CPT | Performed by: OTHER

## 2025-05-22 RX ORDER — LEVETIRACETAM 750 MG/1
750 TABLET ORAL 2 TIMES DAILY
Qty: 180 TABLET | Refills: 3 | Status: SHIPPED | OUTPATIENT
Start: 2025-05-22

## 2025-05-22 NOTE — PROGRESS NOTES
The following individual(s) verbally consented to be recorded using ambient AI listening technology and understand that they can each withdraw their consent to this listening technology at any point by asking the clinician to turn off or pause the recording:    Patient name: Estuardo Boyce  Additional names:  Agustin Boyce

## 2025-05-22 NOTE — PATIENT INSTRUCTIONS
Refill policies:    Allow 2-3 business days for refills; controlled substances may take longer.  Contact your pharmacy at least 5 days prior to running out of medication and have them send an electronic request or submit request through the “request refill” option in your [employer] account.  Refills are not addressed on weekends; covering physicians do not authorize routine medications on weekends.  No narcotics or controlled substances are refilled after noon on Fridays or by on call physicians.  By law, narcotics must be electronically prescribed.  A 30 day supply with no refills is the maximum allowed.  If your prescription is due for a refill, you may be due for a follow up appointment.  To best provide you care, patients receiving routine medications need to be seen at least once a year.  Patients receiving narcotic/controlled substance medications need to be seen at least once every 3 months.  In the event that your preferred pharmacy does not have the requested medication in stock (e.g. Backordered), it is your responsibility to find another pharmacy that has the requested medication available.  We will gladly send a new prescription to that pharmacy at your request.    Scheduling Tests:    If your physician has ordered radiology tests such as MRI or CT scans, please contact Central Scheduling at [phone] right away to schedule the test.  Once scheduled, the [facility] Centralized Referral Team will work with your insurance carrier to obtain pre-certification or prior authorization.  Depending on your insurance carrier, approval may take 3-10 days.  It is highly recommended patients assure they have received an authorization before having a test performed.  If test is done without insurance authorization, patient may be responsible for the entire amount billed.      Precertification and Prior Authorizations:  If your physician has recommended that you have a procedure or additional testing performed the [facility]  Centralized Referral Team will contact your insurance carrier to obtain pre-certification or prior authorization.    You are strongly encouraged to contact your insurance carrier to verify that your procedure/test has been approved and is a COVERED benefit.  Although the [facility] Centralized Referral Team does its due diligence, the insurance carrier gives the disclaimer that \"Although the procedure is authorized, this does not guarantee payment.\"    Ultimately the patient is responsible for payment.   Thank you for your understanding in this matter.  Paperwork Completion:  If you require FMLA or disability paperwork for your recovery, please make sure to either drop it off or have it faxed to our office at [phone]. Be sure the form has your name and date of birth on it.  The form will be faxed to our Forms Department and they will complete it for you.  There is a 25$ fee for all forms that need to be filled out.  Please be aware there is a 10-14 day turnaround time.  You will need to sign a release of information (CHARLA) form if your paperwork does not come with one.  You may call the Forms Department with any questions at [phone].  Their fax number is [phone].

## 2025-05-22 NOTE — PROGRESS NOTES
[facility] Neurology Outpatient Progress Note  Date of service: 5/22/2025        ICD-10-CM    1. History of traumatic brain injury  Z87.820       2. 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 Since last visit no seizure reported. Tolerating medications without side effects.  BP has been always on low side. No new complaints.  Former pt of Dr Santoyo   No seizures or episodes of loss of consciousness since previous visit.  Patient reports she is tolerating the Keppra .  pt and  confirmed that she is taking 750 mg twice a day q12h currently.  She has been following with [facility] neurology regularly since 2021 for seizure management.    History/Other:   REVIEW OF SYSTEMS:  A 10-point system was reviewed. Pertinent positives and negatives are noted as above     Medications - Current[1]  Allergies:  Allergies[2]  Past Medical History[3]  Past Surgical History[4]  Social History:  Social History     Tobacco Use    Smoking status: Former     Passive exposure: Never    Smokeless tobacco: Never    Tobacco comments:     Quit 16 yrs    Substance Use Topics    Alcohol use: Not Currently     Family History[5]   Patient denies any pertinent family history associated with the current problem    Objective:   Neurological Examination:  BP 98/58   Pulse 64   Resp 16   Wt 110 lb (49.9 kg)   BMI 19.49 kg/m²   Mental status: A & O X 3  Language: no aphasia  Speech: no dysarthria  CN II-XII:asymetric face   Motor strength: right hemiplegic but able to move all limbs  DTRs: brisk reflex in right UE and LE, left 5/5  Coordination: FNF fair  Sensory: symmetric  Gait: right leg dragging,  AFO+    Test reviewed on 2025      Paul Alexis MD  Neurology  [facility]  2025, 11:13 AM  CC: Олег Haywood DO         [1]   Current Outpatient Medications:     levetiracetam 750 MG Oral Tab, Take 1 tablet (750 mg total) by mouth 2 (two) times daily., Disp: 180 tablet, Rfl: 3    Cholecalciferol (VITAMIN D3) 75 MCG (3000 UT) Oral Tab, Take by mouth., Disp: , Rfl:     diphenhydrAMINE 25 MG Oral Cap, Take 1 capsule (25 mg total) by mouth as needed., Disp: , Rfl:     aspirin 81 MG Oral Chew Tab, Chew 1 tablet (81 mg total) by mouth daily., Disp: , Rfl:     Multiple Vitamin (MULTIVITAMIN OR), Take by mouth daily., Disp: , Rfl:   [2]   Allergies  Allergen Reactions    Atorvastatin OTHER (SEE COMMENTS)     Passes out and cause seizure    Lamotrigine OTHER (SEE COMMENTS) and RASH     Neck pain   [3]   Past Medical History:   Migraines    Seizure disorder (HCC)    Traumatic brain injury (HCC)   [4]   Past Surgical History:  Procedure Laterality Date          Colonoscopy     [5]   Family History  Problem Relation Age of Onset    Stroke Father

## 2025-06-14 ENCOUNTER — OFFICE VISIT (OUTPATIENT)
Dept: FAMILY MEDICINE CLINIC | Facility: CLINIC | Age: 65
End: 2025-06-14
Payer: MEDICARE

## 2025-06-14 VITALS
HEART RATE: 65 BPM | TEMPERATURE: 98 F | DIASTOLIC BLOOD PRESSURE: 72 MMHG | BODY MASS INDEX: 19.88 KG/M2 | HEIGHT: 63 IN | SYSTOLIC BLOOD PRESSURE: 112 MMHG | RESPIRATION RATE: 16 BRPM | WEIGHT: 112.19 LBS | OXYGEN SATURATION: 97 %

## 2025-06-14 DIAGNOSIS — Z00.00 ENCOUNTER FOR MEDICARE ANNUAL WELLNESS EXAM: ICD-10-CM

## 2025-06-14 DIAGNOSIS — Z87.820 HISTORY OF TRAUMATIC BRAIN INJURY: ICD-10-CM

## 2025-06-14 DIAGNOSIS — G12.29 PSEUDOBULBAR PALSY (HCC): ICD-10-CM

## 2025-06-14 DIAGNOSIS — G40.209: ICD-10-CM

## 2025-06-14 DIAGNOSIS — Z00.00 ENCOUNTER FOR ANNUAL HEALTH EXAMINATION: ICD-10-CM

## 2025-06-14 DIAGNOSIS — Z13.6 SCREENING FOR CARDIOVASCULAR CONDITION: ICD-10-CM

## 2025-06-14 DIAGNOSIS — Z12.31 VISIT FOR SCREENING MAMMOGRAM: ICD-10-CM

## 2025-06-14 DIAGNOSIS — Z00.00 MEDICARE ANNUAL WELLNESS VISIT, INITIAL: Primary | ICD-10-CM

## 2025-06-14 NOTE — PROGRESS NOTES
Subjective:   Estuardo Boyce is a 65 year old female who presents for a Medicare Wellness Visit charge within the last 11 months and Patient may not meet criteria for AWV: Please evaluate for correct coding and scheduled follow up of multiple significant but stable problems.             is here for their MWV physical, has not been hospitalized the past year. no recent surgery, no new RX meds and no new allergies    History/Other:   Fall Risk Assessment:   She has been screened for Falls and is High Risk. Fall Prevention information provided to patient in After Visit Summary.    Do you feel unsteady when standing or walking?: (Patient-Rptd) Yes  Do you worry about falling?: (Patient-Rptd) Yes  Have you fallen in the past year?: (Patient-Rptd) No     Cognitive Assessment:   She had a completely normal cognitive assessment - see flowsheet entries     Functional Ability/Status:   Estuardo Boyce has some abnormal functions as listed below:  She has Driving difficulties based on screening of functional status. She has difficulties Shopping for Groceries based on screening of functional status. She has Walking problems based on screening of functional status.       Depression Screening (PHQ):  PHQ-2 SCORE: 0  , done 6/14/2025             Advanced Directives:   She does NOT have a Living Will. [Do you have a living will?: (Patient-Rptd) No]  She does NOT have a Power of  for Health Care. [Do you have a healthcare power of ?: (Patient-Rptd) No]  Discussed Advance Care Planning with patient (and family/surrogate if present). Standard forms made available to patient in After Visit Summary.      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    Current Medications:  Outpatient Medications Marked as Taking for  the 25 encounter (Office Visit) with Олег Haywood DO   Medication Sig    levetiracetam 750 MG Oral Tab Take 1 tablet (750 mg total) by mouth 2 (two) times daily.    Cholecalciferol (VITAMIN D3) 75 MCG (3000 UT) Oral Tab Take by mouth.    diphenhydrAMINE 25 MG Oral Cap Take 1 capsule (25 mg total) by mouth as needed.    aspirin 81 MG Oral Chew Tab Chew 1 tablet (81 mg total) by mouth daily.    Multiple Vitamin (MULTIVITAMIN OR) Take by mouth daily.       Medical History:  She  has a past medical history of Migraines, Seizure disorder (HCC), and Traumatic brain injury (HCC).  Surgical History:  She  has a past surgical history that includes colonoscopy and .   Family History:  Her family history includes Stroke in her father.  Social History:  She  reports that she has quit smoking. She has never been exposed to tobacco smoke. She has never used smokeless tobacco. She reports that she does not currently use alcohol. She reports that she does not currently use drugs.    Tobacco:  She smoked tobacco in the past but quit greater than 12 months ago.  Social History     Tobacco Use   Smoking Status Former    Passive exposure: Never   Smokeless Tobacco Never   Tobacco Comments    Quit 16 yrs         CAGE Alcohol Screen:   CAGE screening score of 0 on 2025, showing low risk of alcohol abuse.      Patient Care Team:  Олег Haywood DO as PCP - General (Family Medicine)  Thai Santoyo MD as Consulting Physician (NEUROLOGY)  Paul Alexis MD as Consulting Physician (NEUROLOGY)    Review of Systems  GENERAL: feels well otherwise  SKIN: denies any unusual skin lesions  EYES: denies blurred vision or double vision  HEENT: denies nasal congestion, sinus pain or ST  LUNGS: denies shortness of breath with exertion  CARDIOVASCULAR: denies chest pain on exertion  GI: denies abdominal pain, denies heartburn  : denies dysuria, vaginal discharge or itching, no complaint of urinary incontinence   MUSCULOSKELETAL:  denies back pain  NEURO: denies headaches  PSYCHE: denies depression or anxiety  HEMATOLOGIC: denies hx of anemia  ENDOCRINE: denies thyroid history  ALL/ASTHMA: denies hx of allergy or asthma    Objective:   Physical Exam  General Appearance:  Alert, cooperative, no distress, appears stated age   Head:  Normocephalic, without obvious abnormality, atraumatic   Eyes:  PERRL, conjunctiva/corneas clear, EOM's intact both eyes   Ears:  Normal TM's and external ear canals, both ears   Nose: Nares normal, septum midline,mucosa normal, no drainage or sinus tenderness   Throat: Lips, mucosa, and tongue normal; teeth and gums normal   Neck: Supple, symmetrical, trachea midline, no adenopathy;  thyroid: not enlarged, symmetric, no tenderness/mass/nodules; no carotid bruit or JVD   Back:   Symmetric, no curvature, ROM normal, no CVA tenderness   Lungs:   Clear to auscultation bilaterally, respirations unlabored   Heart:  Regular rate and rhythm, S1 and S2 normal, no murmur, rub, or gallop   Abdomen:   Soft, non-tender, bowel sounds active all four quadrants,  no masses, no organomegaly   Pelvic: Deferred   Extremities: Extremities normal, atraumatic, no cyanosis or edema   Pulses: 2+ and symmetric   Skin: Skin color, texture, turgor normal, no rashes or lesions   Lymph nodes: Cervical, supraclavicular, and axillary nodes normal   Neurologic: Normal       /72   Pulse 65   Temp 98.1 °F (36.7 °C) (Temporal)   Resp 16   Ht 5' 3\" (1.6 m)   Wt 112 lb 3.2 oz (50.9 kg)   SpO2 97%   BMI 19.88 kg/m²  Estimated body mass index is 19.88 kg/m² as calculated from the following:    Height as of this encounter: 5' 3\" (1.6 m).    Weight as of this encounter: 112 lb 3.2 oz (50.9 kg).    Medicare Hearing Assessment:   Hearing Screening    Time taken: 6/14/2025  9:31 AM  Screening Method: Finger Rub  Finger Rub Result: Pass         Visual Acuity:   Right Eye Visual Acuity: Corrected Right Eye Chart Acuity: 20/50   Left Eye Visual  Acuity: Corrected Left Eye Chart Acuity: 20/50   Both Eyes Visual Acuity: Corrected Both Eyes Chart Acuity: 20/30   Able To Tolerate Visual Acuity: Yes        Assessment & Plan:   Estuardo Boyce is a 65 year old female who presents for a Medicare Assessment.     1. Medicare annual wellness visit, initial (Primary)  -     Lipid Panel; Future; Expected date: 06/14/2025  -     3D Mammogram Digital Screen, Bilateral (CPT=77067/14491); Future; Expected date: 06/14/2025  -     Comp Metabolic Panel (14); Future; Expected date: 06/14/2025  -     TSH W Reflex To Free T4; Future; Expected date: 06/14/2025  -     CBC With Differential With Platelet; Future; Expected date: 06/14/2025  2. History of traumatic brain injury  -     CBC With Differential With Platelet; Future; Expected date: 06/14/2025, HAS SEEN NEUROLOGY RECENTLY NO CHANGES   3. Pseudobulbar palsy (HCC)  -     Comp Metabolic Panel (14); Future; Expected date: 06/14/2025  -     TSH W Reflex To Free T4; Future; Expected date: 06/14/2025, STABLE ON LEVETICERITAM  4. 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 Visit for screening mammogram  -     3D Mammogram Digital Screen, Bilateral (CPT=77067/13966); Future; Expected date: 06/14/2025  6. Encounter for annual health examination  -     Lipid Panel; Future; Expected date: 06/14/2025  -     3D Mammogram Digital Screen, Bilateral (CPT=77067/16055); Future; Expected date: 06/14/2025  -     Comp Metabolic Panel (14); Future; Expected date: 06/14/2025  -     TSH W Reflex To Free T4; Future; Expected date: 06/14/2025  -     CBC With Differential With Platelet; Future; Expected date: 06/14/2025  7. Encounter for Medicare annual wellness exam  -     Lipid Panel; Future; Expected date: 06/14/2025  -     3D Mammogram Digital Screen, Bilateral (CPT=77067/66557); Future; Expected date: 06/14/2025  -     Comp  Metabolic Panel (14); Future; Expected date: 06/14/2025  -     TSH W Reflex To Free T4; Future; Expected date: 06/14/2025  -     CBC With Differential With Platelet; Future; Expected date: 06/14/2025  8. Screening for cardiovascular condition  -     Lipid Panel; Future; Expected date: 06/14/2025            The patient indicates understanding of these issues and agrees to the plan.  Reinforced healthy diet, lifestyle, and exercise.      Return in 1 year (on 6/14/2026).     Олег Haywood, , 6/14/2025     Supplementary Documentation:   General Health:  In the past six months, have you lost more than 10 pounds without trying?: (Patient-Rptd) 2 - No  Has your appetite been poor?: (Patient-Rptd) No  Type of Diet: (Patient-Rptd) Balanced  How does the patient maintain a good energy level?: (Patient-Rptd) Appropriate Exercise, Daily Walks  How would you describe your daily physical activity?: (Patient-Rptd) Light  How would you describe your current health state?: (Patient-Rptd) Good  On a scale of 0 to 10, with 0 being no pain and 10 being severe pain, what is your pain level?: (Patient-Rptd) 0 - (None)  In the past six months, have you experienced urine leakage?: (Patient-Rptd) 0-No  At any time do you feel concerned for the safety/well-being of yourself and/or your children, in your home or elsewhere?: (Patient-Rptd) No  Have you had any immunizations at another office such as Influenza, Hepatitis B, Tetanus, or Pneumococcal?: (Patient-Rptd) No    Health Maintenance   Topic Date Due    Zoster Vaccines (1 of 2) Never done    Annual Physical  02/21/2025    Mammogram  08/30/2025    COVID-19 Vaccine (1 - 2024-25 season) 07/14/2026 (Originally 9/1/2024)    Colorectal Cancer Screening  02/08/2026    Influenza Vaccine  Completed    DEXA Scan  Completed    Annual Depression Screening  Completed    Fall Risk Screening (Annual)  Completed    Pneumococcal Vaccine: 50+ Years  Completed    Meningococcal B Vaccine  Aged Out

## 2025-06-28 ENCOUNTER — NURSE ONLY (OUTPATIENT)
Dept: FAMILY MEDICINE CLINIC | Facility: CLINIC | Age: 65
End: 2025-06-28
Payer: MEDICARE

## 2025-06-28 DIAGNOSIS — Z87.820 HISTORY OF TRAUMATIC BRAIN INJURY: ICD-10-CM

## 2025-06-28 DIAGNOSIS — Z00.00 ENCOUNTER FOR MEDICARE ANNUAL WELLNESS EXAM: ICD-10-CM

## 2025-06-28 DIAGNOSIS — Z00.00 MEDICARE ANNUAL WELLNESS VISIT, INITIAL: ICD-10-CM

## 2025-06-28 DIAGNOSIS — Z00.00 ENCOUNTER FOR ANNUAL HEALTH EXAMINATION: ICD-10-CM

## 2025-06-28 DIAGNOSIS — G12.29 PSEUDOBULBAR PALSY (HCC): ICD-10-CM

## 2025-06-28 DIAGNOSIS — Z13.6 SCREENING FOR CARDIOVASCULAR CONDITION: ICD-10-CM

## 2025-06-28 DIAGNOSIS — G40.209: ICD-10-CM

## 2025-06-28 DIAGNOSIS — E55.9 VITAMIN D DEFICIENCY: ICD-10-CM

## 2025-06-28 LAB
ALBUMIN SERPL-MCNC: 4.8 G/DL (ref 3.2–4.8)
ALBUMIN/GLOB SERPL: 1.7 {RATIO} (ref 1–2)
ALP LIVER SERPL-CCNC: 86 U/L (ref 55–142)
ALT SERPL-CCNC: 16 U/L (ref 10–49)
ANION GAP SERPL CALC-SCNC: 9 MMOL/L (ref 0–18)
AST SERPL-CCNC: 20 U/L (ref ?–34)
BASOPHILS # BLD AUTO: 0.05 X10(3) UL (ref 0–0.2)
BASOPHILS NFR BLD AUTO: 0.7 %
BILIRUB SERPL-MCNC: 0.4 MG/DL (ref 0.2–1.1)
BUN BLD-MCNC: 13 MG/DL (ref 9–23)
CALCIUM BLD-MCNC: 9.7 MG/DL (ref 8.7–10.6)
CHLORIDE SERPL-SCNC: 106 MMOL/L (ref 98–112)
CHOLEST SERPL-MCNC: 208 MG/DL (ref ?–200)
CO2 SERPL-SCNC: 28 MMOL/L (ref 21–32)
CREAT BLD-MCNC: 0.76 MG/DL (ref 0.55–1.02)
EGFRCR SERPLBLD CKD-EPI 2021: 86 ML/MIN/1.73M2 (ref 60–?)
EOSINOPHIL # BLD AUTO: 0.12 X10(3) UL (ref 0–0.7)
EOSINOPHIL NFR BLD AUTO: 1.7 %
ERYTHROCYTE [DISTWIDTH] IN BLOOD BY AUTOMATED COUNT: 12.1 %
FASTING PATIENT LIPID ANSWER: YES
FASTING STATUS PATIENT QL REPORTED: YES
GLOBULIN PLAS-MCNC: 2.9 G/DL (ref 2–3.5)
GLUCOSE BLD-MCNC: 97 MG/DL (ref 70–99)
HCT VFR BLD AUTO: 44.9 % (ref 35–48)
HDLC SERPL-MCNC: 54 MG/DL (ref 40–59)
HGB BLD-MCNC: 14.9 G/DL (ref 12–16)
IMM GRANULOCYTES # BLD AUTO: 0.01 X10(3) UL (ref 0–1)
IMM GRANULOCYTES NFR BLD: 0.1 %
LDLC SERPL CALC-MCNC: 136 MG/DL (ref ?–100)
LYMPHOCYTES # BLD AUTO: 2.53 X10(3) UL (ref 1–4)
LYMPHOCYTES NFR BLD AUTO: 36.7 %
MCH RBC QN AUTO: 29.9 PG (ref 26–34)
MCHC RBC AUTO-ENTMCNC: 33.2 G/DL (ref 31–37)
MCV RBC AUTO: 90 FL (ref 80–100)
MONOCYTES # BLD AUTO: 0.55 X10(3) UL (ref 0.1–1)
MONOCYTES NFR BLD AUTO: 8 %
NEUTROPHILS # BLD AUTO: 3.63 X10 (3) UL (ref 1.5–7.7)
NEUTROPHILS # BLD AUTO: 3.63 X10(3) UL (ref 1.5–7.7)
NEUTROPHILS NFR BLD AUTO: 52.8 %
NONHDLC SERPL-MCNC: 154 MG/DL (ref ?–130)
OSMOLALITY SERPL CALC.SUM OF ELEC: 296 MOSM/KG (ref 275–295)
PLATELET # BLD AUTO: 263 10(3)UL (ref 150–450)
POTASSIUM SERPL-SCNC: 4.9 MMOL/L (ref 3.5–5.1)
PROT SERPL-MCNC: 7.7 G/DL (ref 5.7–8.2)
RBC # BLD AUTO: 4.99 X10(6)UL (ref 3.8–5.3)
SODIUM SERPL-SCNC: 143 MMOL/L (ref 136–145)
TRIGL SERPL-MCNC: 98 MG/DL (ref 30–149)
TSI SER-ACNC: 2.23 UIU/ML (ref 0.55–4.78)
VIT D+METAB SERPL-MCNC: 82.4 NG/ML (ref 30–100)
VLDLC SERPL CALC-MCNC: 18 MG/DL (ref 0–30)
WBC # BLD AUTO: 6.9 X10(3) UL (ref 4–11)

## 2025-06-28 PROCEDURE — 85025 COMPLETE CBC W/AUTO DIFF WBC: CPT | Performed by: FAMILY MEDICINE

## 2025-06-28 PROCEDURE — 80061 LIPID PANEL: CPT | Performed by: FAMILY MEDICINE

## 2025-06-28 PROCEDURE — 80053 COMPREHEN METABOLIC PANEL: CPT | Performed by: FAMILY MEDICINE

## 2025-06-28 PROCEDURE — 82306 VITAMIN D 25 HYDROXY: CPT | Performed by: FAMILY MEDICINE

## 2025-06-28 PROCEDURE — 84443 ASSAY THYROID STIM HORMONE: CPT | Performed by: FAMILY MEDICINE

## 2025-06-28 NOTE — PROGRESS NOTES
Patient to clinic for labs per atilio Cain and lavnder tube drawn right AC  x 1 attempt with butterfly    Patient left office in stable condition

## 2025-07-14 ENCOUNTER — TELEPHONE (OUTPATIENT)
Dept: FAMILY MEDICINE CLINIC | Facility: CLINIC | Age: 65
End: 2025-07-14

## 2025-07-14 NOTE — TELEPHONE ENCOUNTER
Letter mailed to patient reminding them they have outstanding orders.    Lab Frequency Next Occurrence   3D Mammogram Digital Screen, Bilateral (CPT=77067/11614) Once 06/14/2025

## 2025-07-28 ENCOUNTER — HOSPITAL ENCOUNTER (OUTPATIENT)
Dept: MAMMOGRAPHY | Age: 65
Discharge: HOME | End: 2025-07-28
Attending: FAMILY MEDICINE
Payer: MEDICARE

## 2025-07-28 DIAGNOSIS — Z12.31 VISIT FOR SCREENING MAMMOGRAM: ICD-10-CM

## 2025-07-28 DIAGNOSIS — Z00.00 ENCOUNTER FOR ANNUAL HEALTH EXAMINATION: ICD-10-CM

## 2025-07-28 DIAGNOSIS — Z00.00 MEDICARE ANNUAL WELLNESS VISIT, INITIAL: ICD-10-CM

## 2025-07-28 DIAGNOSIS — Z00.00 ENCOUNTER FOR MEDICARE ANNUAL WELLNESS EXAM: ICD-10-CM

## (undated) DIAGNOSIS — G40.209 PARTIAL SYMPTOMATIC EPILEPSY WITH COMPLEX PARTIAL SEIZURES, NOT INTRACTABLE, WITHOUT STATUS EPILEPTICUS (HCC): Primary | ICD-10-CM

## (undated) DEVICE — STERILE POLYISOPRENE POWDER-FREE SURGICAL GLOVES: Brand: PROTEXIS

## (undated) DEVICE — SOL NACL IRRIG 0.9% 1000ML BTL

## (undated) DEVICE — INFLOWHYSTER S&N

## (undated) DEVICE — SEAL TRUCLEAR  HYSTERSCOP

## (undated) DEVICE — GYN CDS: Brand: MEDLINE INDUSTRIES, INC.

## (undated) DEVICE — 2000CC GUARDIAN II: Brand: GUARDIAN

## (undated) DEVICE — SLEEVE KENDALL SCD EXPRESS MED

## (undated) DEVICE — MEDI-VAC NON-CONDUCTIVE SUCTION TUBING: Brand: CARDINAL HEALTH

## (undated) DEVICE — DEV REMOVAL TRUCLEAR SFT MINI

## (undated) DEVICE — SOLUTION  .9 3000ML

## (undated) DEVICE — SPECIMEN SOCK - STANDARD: Brand: MEDI-VAC

## (undated) DEVICE — OUTFLOW HYSTER S&N

## (undated) NOTE — LETTER
Estuardo Boyce   [address] [place] [address]           Dear Estuardo Boyce     Our records indicate that you have outstanding lab work and or testing that was ordered for you and has not yet been completed:  Lab Frequency Next Occurrence   3D Mammogram Digital Screen, Bilateral (CPT=77067/24946) Once 02/21/2024      To provide you with the best possible care, please complete these orders at your earliest convenience. If you have recently completed these orders please disregard this letter.     If you have any questions please call the office at [phone].     Thank you,     [facility]

## (undated) NOTE — LETTER
Estuardo Boyce   [address] [place] [address]           Dear Estuardo Boyce     Our records indicate that you have outstanding lab work and or testing that was ordered for you and has not yet been completed:  Lab Frequency Next Occurrence   3D Mammogram Digital Screen, Bilateral (CPT=77067/88527) Once 06/14/2025      To provide you with the best possible care, please complete these orders at your earliest convenience. If you have recently completed these orders please disregard this letter.     If you have any questions please call the office at [phone].     Thank you,     [facility]